# Patient Record
Sex: MALE | Race: OTHER | HISPANIC OR LATINO | Employment: UNEMPLOYED | URBAN - METROPOLITAN AREA
[De-identification: names, ages, dates, MRNs, and addresses within clinical notes are randomized per-mention and may not be internally consistent; named-entity substitution may affect disease eponyms.]

---

## 2023-04-28 ENCOUNTER — APPOINTMENT (EMERGENCY)
Dept: RADIOLOGY | Facility: HOSPITAL | Age: 38
End: 2023-04-28

## 2023-04-28 ENCOUNTER — HOSPITAL ENCOUNTER (EMERGENCY)
Facility: HOSPITAL | Age: 38
End: 2023-04-28
Attending: EMERGENCY MEDICINE

## 2023-04-28 ENCOUNTER — APPOINTMENT (EMERGENCY)
Dept: CT IMAGING | Facility: HOSPITAL | Age: 38
End: 2023-04-28

## 2023-04-28 ENCOUNTER — HOSPITAL ENCOUNTER (INPATIENT)
Facility: HOSPITAL | Age: 38
LOS: 2 days | Discharge: OTHER INSTITUTION | End: 2023-04-30
Attending: INTERNAL MEDICINE | Admitting: INTERNAL MEDICINE

## 2023-04-28 VITALS
RESPIRATION RATE: 16 BRPM | SYSTOLIC BLOOD PRESSURE: 131 MMHG | DIASTOLIC BLOOD PRESSURE: 66 MMHG | HEART RATE: 2 BPM | OXYGEN SATURATION: 92 % | TEMPERATURE: 97.3 F | WEIGHT: 190.3 LBS

## 2023-04-28 DIAGNOSIS — F19.10 SUBSTANCE ABUSE (HCC): Primary | ICD-10-CM

## 2023-04-28 DIAGNOSIS — J96.01 ACUTE RESPIRATORY FAILURE WITH HYPOXIA (HCC): ICD-10-CM

## 2023-04-28 DIAGNOSIS — S42.001A RIGHT CLAVICLE FRACTURE: ICD-10-CM

## 2023-04-28 DIAGNOSIS — R50.9 FEVER OF UNKNOWN ORIGIN: Primary | ICD-10-CM

## 2023-04-28 DIAGNOSIS — T40.2X1A OPIOID OVERDOSE (HCC): ICD-10-CM

## 2023-04-28 DIAGNOSIS — R41.82 ALTERED MENTAL STATUS: ICD-10-CM

## 2023-04-28 DIAGNOSIS — G47.00 INSOMNIA: ICD-10-CM

## 2023-04-28 DIAGNOSIS — F41.9 ANXIETY: ICD-10-CM

## 2023-04-28 DIAGNOSIS — J81.0 ACUTE PULMONARY EDEMA (HCC): ICD-10-CM

## 2023-04-28 PROBLEM — R00.0 SINUS TACHYCARDIA: Status: ACTIVE | Noted: 2023-04-28

## 2023-04-28 PROBLEM — J96.91 RESPIRATORY FAILURE WITH HYPOXIA (HCC): Status: ACTIVE | Noted: 2023-04-28

## 2023-04-28 PROBLEM — I48.91 ATRIAL FIBRILLATION (HCC): Status: ACTIVE | Noted: 2023-04-28

## 2023-04-28 LAB
ALBUMIN SERPL BCP-MCNC: 4 G/DL (ref 3.5–5)
ALP SERPL-CCNC: 84 U/L (ref 34–104)
ALT SERPL W P-5'-P-CCNC: 19 U/L (ref 7–52)
AMPHETAMINES SERPL QL SCN: NEGATIVE
ANION GAP SERPL CALCULATED.3IONS-SCNC: 16 MMOL/L (ref 4–13)
AST SERPL W P-5'-P-CCNC: 25 U/L (ref 13–39)
ATRIAL RATE: 102 BPM
BACTERIA UR QL AUTO: ABNORMAL /HPF
BARBITURATES UR QL: NEGATIVE
BASOPHILS # BLD AUTO: 0.04 THOUSANDS/ΜL (ref 0–0.1)
BASOPHILS NFR BLD AUTO: 1 % (ref 0–1)
BENZODIAZ UR QL: NEGATIVE
BILIRUB SERPL-MCNC: 0.36 MG/DL (ref 0.2–1)
BILIRUB UR QL STRIP: NEGATIVE
BUN SERPL-MCNC: 18 MG/DL (ref 5–25)
CALCIUM SERPL-MCNC: 8.9 MG/DL (ref 8.4–10.2)
CHLORIDE SERPL-SCNC: 99 MMOL/L (ref 96–108)
CK SERPL-CCNC: 235 U/L (ref 39–308)
CLARITY UR: ABNORMAL
CO2 SERPL-SCNC: 20 MMOL/L (ref 21–32)
COCAINE UR QL: POSITIVE
COLOR UR: YELLOW
CREAT SERPL-MCNC: 1.33 MG/DL (ref 0.6–1.3)
EOSINOPHIL # BLD AUTO: 0.2 THOUSAND/ΜL (ref 0–0.61)
EOSINOPHIL NFR BLD AUTO: 2 % (ref 0–6)
ERYTHROCYTE [DISTWIDTH] IN BLOOD BY AUTOMATED COUNT: 12.8 % (ref 11.6–15.1)
ETHANOL SERPL-MCNC: <10 MG/DL
FINE GRAN CASTS URNS QL MICRO: ABNORMAL /LPF
GFR SERPL CREATININE-BSD FRML MDRD: 66 ML/MIN/1.73SQ M
GLUCOSE SERPL-MCNC: 207 MG/DL (ref 65–140)
GLUCOSE SERPL-MCNC: 226 MG/DL (ref 65–140)
GLUCOSE UR STRIP-MCNC: NEGATIVE MG/DL
HCT VFR BLD AUTO: 40.6 % (ref 36.5–49.3)
HGB BLD-MCNC: 12.4 G/DL (ref 12–17)
HGB UR QL STRIP.AUTO: 25
HYALINE CASTS #/AREA URNS LPF: ABNORMAL /LPF
IMM GRANULOCYTES # BLD AUTO: 0.04 THOUSAND/UL (ref 0–0.2)
IMM GRANULOCYTES NFR BLD AUTO: 1 % (ref 0–2)
KETONES UR STRIP-MCNC: NEGATIVE MG/DL
LEUKOCYTE ESTERASE UR QL STRIP: NEGATIVE
LYMPHOCYTES # BLD AUTO: 2.83 THOUSANDS/ΜL (ref 0.6–4.47)
LYMPHOCYTES NFR BLD AUTO: 33 % (ref 14–44)
MCH RBC QN AUTO: 28.8 PG (ref 26.8–34.3)
MCHC RBC AUTO-ENTMCNC: 30.5 G/DL (ref 31.4–37.4)
MCV RBC AUTO: 94 FL (ref 82–98)
METHADONE UR QL: NEGATIVE
MONOCYTES # BLD AUTO: 0.72 THOUSAND/ΜL (ref 0.17–1.22)
MONOCYTES NFR BLD AUTO: 8 % (ref 4–12)
MUCOUS THREADS UR QL AUTO: ABNORMAL
NEUTROPHILS # BLD AUTO: 4.71 THOUSANDS/ΜL (ref 1.85–7.62)
NEUTS SEG NFR BLD AUTO: 55 % (ref 43–75)
NITRITE UR QL STRIP: NEGATIVE
NON-SQ EPI CELLS URNS QL MICRO: ABNORMAL /HPF
NRBC BLD AUTO-RTO: 0 /100 WBCS
OPIATES UR QL SCN: POSITIVE
OXYCODONE+OXYMORPHONE UR QL SCN: NEGATIVE
P AXIS: 78 DEGREES
PCP UR QL: NEGATIVE
PH UR STRIP.AUTO: 5 [PH]
PLATELET # BLD AUTO: 359 THOUSANDS/UL (ref 149–390)
PMV BLD AUTO: 9.2 FL (ref 8.9–12.7)
POTASSIUM SERPL-SCNC: 3.8 MMOL/L (ref 3.5–5.3)
PR INTERVAL: 142 MS
PROT SERPL-MCNC: 7.3 G/DL (ref 6.4–8.4)
PROT UR STRIP-MCNC: ABNORMAL MG/DL
QRS AXIS: 67 DEGREES
QRSD INTERVAL: 80 MS
QT INTERVAL: 350 MS
QTC INTERVAL: 456 MS
RBC # BLD AUTO: 4.31 MILLION/UL (ref 3.88–5.62)
RBC #/AREA URNS AUTO: ABNORMAL /HPF
SODIUM SERPL-SCNC: 135 MMOL/L (ref 135–147)
SP GR UR STRIP.AUTO: 1.02 (ref 1–1.04)
T WAVE AXIS: 63 DEGREES
THC UR QL: NEGATIVE
UROBILINOGEN UA: NEGATIVE MG/DL
VENTRICULAR RATE: 102 BPM
WBC # BLD AUTO: 8.54 THOUSAND/UL (ref 4.31–10.16)
WBC #/AREA URNS AUTO: ABNORMAL /HPF

## 2023-04-28 RX ORDER — CHLORHEXIDINE GLUCONATE 0.12 MG/ML
15 RINSE ORAL EVERY 12 HOURS SCHEDULED
Status: DISCONTINUED | OUTPATIENT
Start: 2023-04-28 | End: 2023-04-29

## 2023-04-28 RX ORDER — NALOXONE HYDROCHLORIDE 1 MG/ML
INJECTION INTRAMUSCULAR; INTRAVENOUS; SUBCUTANEOUS
Status: COMPLETED
Start: 2023-04-28 | End: 2023-04-28

## 2023-04-28 RX ORDER — FAMOTIDINE 10 MG/ML
20 INJECTION, SOLUTION INTRAVENOUS 2 TIMES DAILY
Status: DISCONTINUED | OUTPATIENT
Start: 2023-04-28 | End: 2023-04-28

## 2023-04-28 RX ORDER — HALOPERIDOL 5 MG/ML
5 INJECTION INTRAMUSCULAR ONCE
Status: COMPLETED | OUTPATIENT
Start: 2023-04-28 | End: 2023-04-28

## 2023-04-28 RX ORDER — NALOXONE HYDROCHLORIDE 1 MG/ML
2 INJECTION INTRAMUSCULAR; INTRAVENOUS; SUBCUTANEOUS ONCE
Status: DISCONTINUED | OUTPATIENT
Start: 2023-04-28 | End: 2023-04-28

## 2023-04-28 RX ORDER — MIDAZOLAM HYDROCHLORIDE 1 MG/ML
1 INJECTION INTRAMUSCULAR; INTRAVENOUS ONCE
Status: COMPLETED | OUTPATIENT
Start: 2023-04-28 | End: 2023-04-28

## 2023-04-28 RX ORDER — NALOXONE HYDROCHLORIDE 1 MG/ML
2 INJECTION INTRAMUSCULAR; INTRAVENOUS; SUBCUTANEOUS ONCE
Status: COMPLETED | OUTPATIENT
Start: 2023-04-28 | End: 2023-04-28

## 2023-04-28 RX ORDER — ACETAMINOPHEN 650 MG/1
650 SUPPOSITORY RECTAL EVERY 4 HOURS PRN
Status: DISCONTINUED | OUTPATIENT
Start: 2023-04-28 | End: 2023-04-29

## 2023-04-28 RX ORDER — NALOXONE HYDROCHLORIDE 1 MG/ML
INJECTION INTRAMUSCULAR; INTRAVENOUS; SUBCUTANEOUS
Status: DISCONTINUED
Start: 2023-04-28 | End: 2023-04-28 | Stop reason: HOSPADM

## 2023-04-28 RX ORDER — NALOXONE HYDROCHLORIDE 1 MG/ML
1 INJECTION PARENTERAL ONCE
Status: COMPLETED | OUTPATIENT
Start: 2023-04-28 | End: 2023-04-28

## 2023-04-28 RX ORDER — LORAZEPAM 2 MG/ML
2 INJECTION INTRAMUSCULAR ONCE
Status: COMPLETED | OUTPATIENT
Start: 2023-04-28 | End: 2023-04-28

## 2023-04-28 RX ORDER — FUROSEMIDE 10 MG/ML
40 INJECTION INTRAMUSCULAR; INTRAVENOUS ONCE
Status: COMPLETED | OUTPATIENT
Start: 2023-04-28 | End: 2023-04-28

## 2023-04-28 RX ORDER — BENZTROPINE MESYLATE 1 MG/ML
2 INJECTION INTRAMUSCULAR; INTRAVENOUS ONCE
Status: COMPLETED | OUTPATIENT
Start: 2023-04-28 | End: 2023-04-28

## 2023-04-28 RX ADMIN — NALOXONE HYDROCHLORIDE 0.8 MG/HR: 1 INJECTION INTRAMUSCULAR; INTRAVENOUS; SUBCUTANEOUS at 10:16

## 2023-04-28 RX ADMIN — NALOXONE HYDROCHLORIDE 2 MG: 1 INJECTION INTRAMUSCULAR; INTRAVENOUS; SUBCUTANEOUS at 09:02

## 2023-04-28 RX ADMIN — SODIUM CHLORIDE 1000 ML: 0.9 INJECTION, SOLUTION INTRAVENOUS at 09:13

## 2023-04-28 RX ADMIN — NALOXONE HYDROCHLORIDE 0.4 MG/HR: 1 INJECTION INTRAMUSCULAR; INTRAVENOUS; SUBCUTANEOUS at 07:51

## 2023-04-28 RX ADMIN — LORAZEPAM 2 MG: 2 INJECTION INTRAMUSCULAR; INTRAVENOUS at 05:49

## 2023-04-28 RX ADMIN — NALOXONE HYDROCHLORIDE 0.8 MG/HR: 1 INJECTION PARENTERAL at 13:53

## 2023-04-28 RX ADMIN — SODIUM CHLORIDE 1000 ML: 0.9 INJECTION, SOLUTION INTRAVENOUS at 06:10

## 2023-04-28 RX ADMIN — ACETAMINOPHEN 650 MG: 650 SUPPOSITORY RECTAL at 14:31

## 2023-04-28 RX ADMIN — BENZTROPINE MESYLATE 2 MG: 1 INJECTION INTRAMUSCULAR; INTRAVENOUS at 05:49

## 2023-04-28 RX ADMIN — NALOXONE HYDROCHLORIDE 0.8 MG/HR: 1 INJECTION INTRAMUSCULAR; INTRAVENOUS; SUBCUTANEOUS at 11:51

## 2023-04-28 RX ADMIN — NALOXONE HYDROCHLORIDE 2 MG: 1 INJECTION INTRAMUSCULAR; INTRAVENOUS; SUBCUTANEOUS at 07:05

## 2023-04-28 RX ADMIN — NALOXONE HYDROCHLORIDE 2 MG: 1 INJECTION INTRAMUSCULAR; INTRAVENOUS; SUBCUTANEOUS at 06:10

## 2023-04-28 RX ADMIN — CHLORHEXIDINE GLUCONATE 0.12% ORAL RINSE 15 ML: 1.2 LIQUID ORAL at 20:17

## 2023-04-28 RX ADMIN — FAMOTIDINE 20 MG: 10 INJECTION INTRAVENOUS at 18:39

## 2023-04-28 RX ADMIN — FUROSEMIDE 40 MG: 10 INJECTION, SOLUTION INTRAVENOUS at 06:41

## 2023-04-28 RX ADMIN — HALOPERIDOL LACTATE 5 MG: 5 INJECTION, SOLUTION INTRAMUSCULAR at 05:49

## 2023-04-28 NOTE — ASSESSMENT & PLAN NOTE
· History of atrial fibrillation   · Sinus tachycardia at this time  · Continuous cardiopulmonary monitoring  · Unknown home medications  · Anticoagulate if needed

## 2023-04-28 NOTE — ASSESSMENT & PLAN NOTE
· Secondary to opioid overdose   · Resolved with Narcan drip  · Admitted to the ICU  · Continuous cardiopulmonary monitoring

## 2023-04-28 NOTE — RESTRAINT FACE TO FACE
Restraint Face to Face   Faith Quintero 42 y o  male MRN: [id number]  Unit/Bed#: ED 11 Encounter: [id number]      Physical Evaluation agitated, aggressive  Purpose for Restraints/ Seclusion High risk for harm to others  Patient's reaction to the intervention agitated  Patient's medical condition stable, guarded  Patient's Behavioral condition agitated  Restraints to be Continued

## 2023-04-28 NOTE — PLAN OF CARE
Problem: RESPIRATORY - ADULT  Goal: Achieves optimal ventilation and oxygenation  Description: INTERVENTIONS:  - Assess for changes in respiratory status  - Assess for changes in mentation and behavior  - Position to facilitate oxygenation and minimize respiratory effort  - Oxygen administered by appropriate delivery if ordered  - Initiate smoking cessation education as indicated  - Encourage broncho-pulmonary hygiene including cough, deep breathe, Incentive Spirometry  - Assess the need for suctioning and aspirate as needed  - Assess and instruct to report SOB or any respiratory difficulty  - Respiratory Therapy support as indicated  Outcome: Progressing     Problem: METABOLIC, FLUID AND ELECTROLYTES - ADULT  Goal: Electrolytes maintained within normal limits  Description: INTERVENTIONS:  - Monitor labs and assess patient for signs and symptoms of electrolyte imbalances  - Administer electrolyte replacement as ordered  - Monitor response to electrolyte replacements, including repeat lab results as appropriate  - Instruct patient on fluid and nutrition as appropriate  Outcome: Progressing  Goal: Fluid balance maintained  Description: INTERVENTIONS:  - Monitor labs   - Monitor I/O and WT  - Instruct patient on fluid and nutrition as appropriate  - Assess for signs & symptoms of volume excess or deficit  Outcome: Progressing  Goal: Glucose maintained within target range  Description: INTERVENTIONS:  - Monitor Blood Glucose as ordered  - Assess for signs and symptoms of hyperglycemia and hypoglycemia  - Administer ordered medications to maintain glucose within target range  - Assess nutritional intake and initiate nutrition service referral as needed  Outcome: Progressing

## 2023-04-28 NOTE — EMTALA/ACUTE CARE TRANSFER
[facility] EMERGENCY DEPARTMENT  [address] [facility] [address]  [phone]  Dept: [phone]      EMTALA TRANSFER CONSENT    NAME Margaret ZHOU 1985                              MRN [id number]    I have been informed of my rights regarding examination, treatment, and transfer   by Dr Rosalva Feng, *    Benefits:  ICU care    Risks:  delay in care      Transfer Request   I acknowledge that my medical condition has been evaluated and explained to me by the emergency department physician or other qualified medical person and/or my attending physician who has recommended and offered to me further medical examination and treatment  I understand the Hospital's obligation with respect to the treatment and stabilization of my emergency medical condition  I nevertheless request to be transferred  I release the Hospital, the doctor, and any other persons caring for me from all responsibility or liability for any injury or ill effects that may result from my transfer and agree to accept all responsibility for the consequences of my choice to transfer, rather than receive stabilizing treatment at the Hospital  I understand that because the transfer is my request, my insurance may not provide reimbursement for the services  The Hospital will assist and direct me and my family in how to make arrangements for transfer, but the hospital is not liable for any fees charged by the transport service  In spite of this understanding, I refuse to consent to further medical examination and treatment which has been offered to me, and request transfer to    I authorize the performance of emergency medical procedures and treatments upon me in both transit and upon arrival at the receiving facility    Additionally, I authorize the release of any and all medical records to the receiving facility and request they be transported with me, if possible  I authorize the performance of emergency medical procedures and treatments upon me in both transit and upon arrival at the receiving facility  Additionally, I authorize the release of any and all medical records to the receiving facility and request they be transported with me, if possible  I understand that the safest mode of transportation during a medical emergency is an ambulance and that the Hospital advocates the use of this mode of transport  Risks of traveling to the receiving facility by car, including absence of medical control, life sustaining equipment, such as oxygen, and medical personnel has been explained to me and I fully understand them  (VIGNESH CORRECT BOX BELOW)  [  ]  I consent to the stated transfer and to be transported by ambulance/helicopter  [  ]  I consent to the stated transfer, but refuse transportation by ambulance and accept full responsibility for my transportation by car  I understand the risks of non-ambulance transfers and I exonerate the Hospital and its staff from any deterioration in my condition that results from this refusal     X___________________________________________    DATE  23  TIME________  Signature of patient or legally responsible individual signing on patient behalf           RELATIONSHIP TO PATIENT_________________________          Provider Certification    NAME Lillie Larkin                                        [facility] 1985                              MRN [id number]    A medical screening exam was performed on the above named patient  Based on the examination:    Condition Necessitating Transfer The primary encounter diagnosis was Substance abuse ([address] )  Diagnoses of Altered mental status, Acute respiratory failure with hypoxia ([address] ), and Opioid overdose ([facility] [address] ) were also pertinent to this visit      Patient Condition:      Reason for Transfer:      Transfer Requirements: Facility     · Space available and qualified personnel available for treatment as acknowledged by    · Agreed to accept transfer and to provide appropriate medical treatment as acknowledged by          · Appropriate medical records of the examination and treatment of the patient are provided at the time of transfer   [address] [facility], [address] _______  · Transfer will be performed by qualified personnel from    and appropriate transfer equipment as required, including the use of necessary and appropriate life support measures  Provider Certification: I have examined the patient and explained the following risks and benefits of being transferred/refusing transfer to the patient/family:         Based on these reasonable risks and benefits to the patient and/or the unborn child(javon), and based upon the information available at the time of the patient’s examination, I certify that the medical benefits reasonably to be expected from the provision of appropriate medical treatments at another medical facility outweigh the increasing risks, if any, to the individual’s medical condition, and in the case of labor to the unborn child, from effecting the transfer      X____________________________________________ DATE 04/28/23        TIME_______      ORIGINAL - SEND TO MEDICAL RECORDS   COPY - SEND WITH PATIENT DURING TRANSFER

## 2023-04-28 NOTE — ASSESSMENT & PLAN NOTE
· Present on admission to the emergency department at [facility]  · Resolved with oxygen administration  · Repeat chest x-ray in the morning

## 2023-04-28 NOTE — H&P
[address] [place] [address]  H&P  Name: Rebecca Martinez 42 y o  male I MRN: [id number]  Unit/Bed#: ICU 04 I Date of Admission: 4/28/2023   Date of Service: 4/28/2023 I Hospital Day: 0      Assessment/Plan   * Opioid overdose ([address] )  Assessment & Plan  · Patient admits to using heroin and cocaine  · Requiring a Narcan drip  · Transfer from [facility]  · Admit to the ICU    Sinus tachycardia  Assessment & Plan  · Likely secondary to febrile state  · Tylenol to treat fever  · Monitor heart rate    Acute pulmonary edema (HCC)  Assessment & Plan  · Present on admission to the emergency department at [facility]  · Resolved with oxygen administration  · Repeat chest x-ray in the morning    Fever of unknown origin  Assessment & Plan  · Temperature 101  · Rectal Tylenol ordered  · Every 4 hours vital signs  · Check blood cultures    Atrial fibrillation (HCC)  Assessment & Plan  · History of atrial fibrillation  · Sinus tachycardia at this time  · Continuous cardiopulmonary monitoring  · Unknown home medications  · Anticoagulate if needed      Respiratory failure with hypoxia (HCC)  Assessment & Plan  · Secondary to opioid overdose  · Resolved with Narcan drip  · Admitted to the ICU  · Continuous cardiopulmonary monitoring           HPI:    Rebecca Martinez is a 42 y o  male who presents as a transfer from [address] [place] [address]  Not much is known about the patient  He was found facedown on a stairway and unresponsive  EMS was activated and the patient was transported to [address] [facility] emergency department  He was given 10 mg of Narcan in the emergency department which caused him to begin to wake up  At that time he admitted to using heroin and crack cocaine  He shortly became unresponsive again and was started on a Narcan drip  Upon arrival to [facility] [place] [place] patient does not respond to loud voice or noxious stimuli just grunting and moving away    He is protecting his airway and respirating without difficulty  He is not responding to questions at all  Review of Systems:  Unable to obtain as the patient is unresponsive  Historical Information   Past Medical History:   Diagnosis Date    Atrial fibrillation ([address] )      No past surgical history on file  Social History   Social History     Substance and Sexual Activity   Alcohol Use Not Currently     Social History     Substance and Sexual Activity   Drug Use Yes    Comment: K2     Social History     Tobacco Use   Smoking Status Every Day    Types: Cigarettes   Smokeless Tobacco Never       Family History:   No family history on file  Medications:  Pertinent medications were reviewed  Current Facility-Administered Medications   Medication Dose Route Frequency Provider Last Rate    acetaminophen  650 mg Rectal Q4H PRN Lucilla Sonia, CRNP      chlorhexidine  15 mL Mouth/Throat Q12H [address] Lucilla Sonia, CRNP      Famotidine (PF)  20 mg Intravenous BID Lucilla Sonia, CRNP      naloxone [facility]) 500 mL infusion  0 8 mg/hr Intravenous Continuous Lucilla Sonia, CRNP 0 8 mg/hr (04/28/23 1353)         Allergies   Allergen Reactions    Robaxin [Methocarbamol] Other (See Comments)     Not specified         Vitals:   /70 (BP Location: Right arm)   Pulse 105   Temp (!) 101 8 °F (38 8 °C) (Axillary)   Resp 20   SpO2 91%   There is no height or weight on file to calculate BMI    SpO2: 91 %,   SpO2 Activity: At Rest,   O2 Device: None (Room air)      Intake/Output Summary (Last 24 hours) at 4/28/2023 1353  Last data filed at 4/28/2023 1231  Gross per 24 hour   Intake --   Output 50 ml   Net -50 ml     Invasive Devices     Peripheral Intravenous Line  Duration           Peripheral IV 04/28/23 Left;Proximal;Ventral (anterior) Forearm <1 day          Drain  Duration           Urethral Catheter Latex 16 Fr  <1 day                Physical Exam:  Gen: Snoring and unresponsive  HEENT: Atraumatic normocephalic pupils equal round reactive to light extraocular movements intact sclera anicteric oral mucosa is pink and moist  Neck: Supple no JVD no lymphadenopathy trachea midline  Chest: Rhonchorous bilaterally respirations are tachypneic and regular  Cor: Heart tones are difficult to auscultate secondary to lung sounds  Monitor demonstrates sinus tachycardia  Abd: Soft nontender with positive bowel sounds  Ext: No clubbing cyanosis or edema  Neuro: Somnolent, unresponsive  Skin: Warm dry and intact no rash multiple tattoos      Diagnostic Data:  Lab: I have personally reviewed pertinent lab results  ,   CBC:  Results from last 7 days   Lab Units 04/28/23  0550   WBC Thousand/uL 8 54   HEMOGLOBIN g/dL 12 4   HEMATOCRIT % 40 6   PLATELETS Thousands/uL 359      CMP:   Lab Results   Component Value Date    SODIUM 135 04/28/2023    K 3 8 04/28/2023    CL 99 04/28/2023    CO2 20 (L) 04/28/2023    BUN 18 04/28/2023    CREATININE 1 33 (H) 04/28/2023    CALCIUM 8 9 04/28/2023    AST 25 04/28/2023    ALT 19 04/28/2023    ALKPHOS 84 04/28/2023    EGFR 66 04/28/2023   ,   PT/INR: No results found for: PT, INR,   Magnesium: No components found for: MAG,  Phosphorous: No results found for: PHOS    ABG: No results found for: PHART, KHJ7LFW, PO2ART, BTW7STY, Z4REIZOK, BEART, SOURCE,     Microbiology:  Blood cultures ordered    Imaging: I have personally reviewed the pertinent imaging studies on the PACS system  Persistent bilateral upper lung opacity, aspiration versus reexpansion pulmonary edema  Right wrist x-ray demonstrates no fracture or deformity  Left wrist x-ray demonstrates no fracture or deformity  C-spine x-ray demonstrates: No acute compression collapse vertebra  Deformity of the right clavicle with a nonunited fracture  Correlate clinically to exclude any acute injury    Correlation with the radiograph of the clavicle suggest  CT of the head mistreats no mass effect, midline shift or hemorrhage      Cardiac/EKG/telemetry/Echo:   Results from "last 7 days   Lab Units 04/28/23  0550   CK TOTAL U/L 235     Sinus tachycardia      VTE Prophylaxis: Sequential compression device Deandradejuan Blue)     Code Status: Level 1 - Full Code    VERONICA Kirkland    Portions of the record may have been created with voice recognition software  Occasional wrong word or \"sound a like\" substitutions may have occurred due to the inherent limitations of voice recognition software  Read the chart carefully and recognize, using context, where substitutions have occurred    "

## 2023-04-28 NOTE — ASSESSMENT & PLAN NOTE
· Patient admits to using heroin and cocaine  · Requiring a Narcan drip  · Transfer from [address] [place] [address]  · Admit to the ICU

## 2023-04-28 NOTE — CERTIFIED RECOVERY SPECIALIST
Certified  Note    Patient name: Nani Shannon  Location: [facility] Pool [facility]  [place]: [address] [place] [address]  Attending:  Paul Mcwilliams, * MRN [id number]  : 1985  Age: 42 y o  Sex: male Date 2023         Substance Use History:     Social History     Substance and Sexual Activity   Alcohol Use Not Currently        Social History     Substance and Sexual Activity   Drug Use Yes    Comment: K2     CRS received consult to meet with patient  Patient will be transferred to [address]    CRS will follow up at the [facility] when he arrives and if he is still hospitalized     Clifton Angeles

## 2023-04-28 NOTE — ASSESSMENT & PLAN NOTE
· Patient admits to using heroin and cocaine  · Requiring a Narcan drip which has been off since around 1400 on 4/28  · Transfer from [facility]  · Admit to the ICU

## 2023-04-28 NOTE — ED NOTES
Momin catheter placed  Only a few mls of urine return; patient's pants are saturated with urine from prior to arrival  Provider katiana Coleman RN  04/28/23 7959

## 2023-04-28 NOTE — ED PROVIDER NOTES
History  Chief Complaint   Patient presents with   • Recreational Drug Use     Arrives via EMS and [employer] after being found yelling and screaming; suspect K2 use  Patient arrives to ED yelling and screaming non stop  77-year-old gentleman was found unresponsive in an alley  He was lying on his side with some vomitus near his person  He was given 2 mg of Narcan IV at which time he awoke and was acutely agitated and violent  He calmed down momentarily and then continued to fight and screaming yelling and cursing in [address]  Police were contacted and assisted in securing the patient for transport  Patient arrived continuing to fight the medics as he even after being handcuffed to the letter  Patient was not redirectable  Prior to moving him from the transport letter to our letter, he was given Haldol, Ativan, and Cogentin  Soon after the patient began speaking in [place] asking what happened  He admitted to doing drugs but stated he did not know which kind  Altered Mental Status  Presenting symptoms: behavior changes, combativeness and unresponsiveness    Severity:  Severe  Most recent episode: Today  Episode history:  Single  Duration: unknown  Timing:  Constant  Progression:  Waxing and waning  Chronicity: unknown  Context: drug use    Associated symptoms: agitation        None       Past Medical History:   Diagnosis Date   • Atrial fibrillation ([facility] [address] )        History reviewed  No pertinent surgical history  History reviewed  No pertinent family history  I have reviewed and agree with the history as documented      E-Cigarette/Vaping   • E-Cigarette Use Never User      E-Cigarette/Vaping Substances   • Nicotine No    • THC No    • CBD No    • Flavoring No      Social History     Tobacco Use   • Smoking status: Every Day     Types: Cigarettes   • Smokeless tobacco: Never   Vaping Use   • Vaping Use: Never used   Substance Use Topics   • Alcohol use: Not Currently   • Drug use: Yes     Comment: K2 Review of Systems   Unable to perform ROS: Mental status change   Skin: Positive for wound (abrasion to right wrist)  Psychiatric/Behavioral: Positive for agitation  Physical Exam  Physical Exam  Vitals and nursing note reviewed  Constitutional:       Appearance: He is diaphoretic  HENT:      Head: Normocephalic and atraumatic  Right Ear: External ear normal       Left Ear: External ear normal       Nose: Nose normal       Mouth/Throat:      Mouth: Mucous membranes are moist    Eyes:      Conjunctiva/sclera: Conjunctivae normal    Cardiovascular:      Rate and Rhythm: Tachycardia present  Pulmonary:      Effort: Pulmonary effort is normal  No respiratory distress  Abdominal:      Tenderness: There is no abdominal tenderness  Comments: Large surgical scar noted     Musculoskeletal:      Cervical back: Normal range of motion  Skin:     General: Skin is warm  Neurological:      Mental Status: He is alert  He is confused  GCS: GCS eye subscore is 4  GCS verbal subscore is 4  GCS motor subscore is 5  Cranial Nerves: No cranial nerve deficit  Motor: No weakness  Psychiatric:         Mood and Affect: Affect is angry and inappropriate  Speech: Speech is rapid and pressured  Behavior: Behavior is agitated, aggressive and combative  Judgment: Judgment is impulsive and inappropriate           Vital Signs  ED Triage Vitals   Temperature Pulse Respirations Blood Pressure SpO2   04/28/23 0610 04/28/23 0555 04/28/23 0555 04/28/23 0555 04/28/23 0555   98 °F (36 7 °C) 98 18 (!) 126/105 90 %      Temp Source Heart Rate Source Patient Position - Orthostatic VS BP Location FiO2 (%)   04/28/23 0610 04/28/23 0555 04/28/23 0555 04/28/23 0555 --   Tympanic Monitor Lying Right arm       Pain Score       --                  Vitals:    04/28/23 0555 04/28/23 0610   BP: (!) 126/105 131/77   Pulse: 98 (!) 112   Patient Position - Orthostatic VS: Lying          Visual Acuity  Visual Acuity    Flowsheet Row Most Recent Value   L Pupil Size (mm) 1   R Pupil Size (mm) 1          ED Medications  Medications   naloxone (FOR EMS ONLY) (NARCAN) 2 MG/2ML injection 2 mg (0 mg Does not apply Given to EMS 4/28/23 0548)   sodium chloride 0 9 % bolus 1,000 mL (0 mL Intravenous Stopped 4/28/23 0620)   haloperidol lactate (HALDOL) injection 5 mg (5 mg Intramuscular Given 4/28/23 0549)   LORazepam (ATIVAN) injection 2 mg (2 mg Intramuscular Given 4/28/23 0549)   benztropine (COGENTIN) injection 2 mg (2 mg Intramuscular Given 4/28/23 0549)   midazolam (FOR EMS ONLY) (VERSED) 2 mg/2 mL injection 2 mg (0 mg Does not apply Given to EMS 4/28/23 0548)   naloxone [facility]) injection 2 mg (2 mg Intravenous Given 4/28/23 0610)   furosemide (LASIX) injection 40 mg (40 mg Intravenous Given 4/28/23 0641)       Diagnostic Studies  Results Reviewed     Procedure Component Value Units Date/Time    UA (URINE) with reflex to Scope [[id number]] Updated: 04/28/23 0651    Lab Status: No result Specimen: Urine, Indwelling Momin Catheter     Rapid drug screen, urine [[id number]] Collected: 04/28/23 0647    Lab Status: No result Specimen: Urine, Catheter     Comprehensive metabolic panel [[id number]]  (Abnormal) Collected: 04/28/23 0550    Lab Status: Final result Specimen: Blood from Line, Venous Updated: 04/28/23 0618     Sodium 135 mmol/L      Potassium 3 8 mmol/L      Chloride 99 mmol/L      CO2 20 mmol/L      ANION GAP 16 mmol/L      BUN 18 mg/dL      Creatinine 1 33 mg/dL      Glucose 207 mg/dL      Calcium 8 9 mg/dL      AST 25 U/L      ALT 19 U/L      Alkaline Phosphatase 84 U/L      Total Protein 7 3 g/dL      Albumin 4 0 g/dL      Total Bilirubin 0 36 mg/dL      eGFR 66 ml/min/1 73sq m     Narrative:      Jonathon guidelines for Chronic Kidney Disease (CKD):   •  Stage 1 with normal or high GFR (GFR > 90 mL/min/1 73 square meters)  •  Stage 2 Mild CKD (GFR = 60-89 mL/min/1 73 square meters)  •  Stage 3A Moderate CKD (GFR = 45-59 mL/min/1 73 square meters)  •  Stage 3B Moderate CKD (GFR = 30-44 mL/min/1 73 square meters)  •  Stage 4 Severe CKD (GFR = 15-29 mL/min/1 73 square meters)  •  Stage 5 End Stage CKD (GFR <15 mL/min/1 73 square meters)  Note: GFR calculation is accurate only with a steady state creatinine    CK [[id number]]  (Normal) Collected: 04/28/23 0550    Lab Status: Final result Specimen: Blood from Line, Venous Updated: 04/28/23 0618     Total  U/L     Ethanol [[id number]]  (Normal) Collected: 04/28/23 0550    Lab Status: Final result Specimen: Blood from Arm, Left Updated: 04/28/23 0616     Ethanol Lvl <10 mg/dL     CBC and differential [[id number]]  (Abnormal) Collected: 04/28/23 0550    Lab Status: Final result Specimen: Blood from Line, Venous Updated: 04/28/23 0556     WBC 8 54 Thousand/uL      RBC 4 31 Million/uL      Hemoglobin 12 4 g/dL      Hematocrit 40 6 %      MCV 94 fL      MCH 28 8 pg      MCHC 30 5 g/dL      RDW 12 8 %      MPV 9 2 fL      Platelets 659 Thousands/uL      nRBC 0 /100 WBCs      Neutrophils Relative 55 %      Immat GRANS % 1 %      Lymphocytes Relative 33 %      Monocytes Relative 8 %      Eosinophils Relative 2 %      Basophils Relative 1 %      Neutrophils Absolute 4 71 Thousands/µL      Immature Grans Absolute 0 04 Thousand/uL      Lymphocytes Absolute 2 83 Thousands/µL      Monocytes Absolute 0 72 Thousand/µL      Eosinophils Absolute 0 20 Thousand/µL      Basophils Absolute 0 04 Thousands/µL     Fingerstick Glucose (POCT) [[id number]]  (Abnormal) Collected: 04/28/23 0548    Lab Status: Final result Updated: 04/28/23 0552     POC Glucose 226 mg/dl                  CT head without contrast    (Results Pending)   XR chest 1 view portable    (Results Pending)              Procedures  ECG 12 Lead Documentation Only    Date/Time: 4/28/2023 5:54 AM  Performed by: Zay Huang DO  Authorized by:  Zay Huang DO     ECG reviewed by me, the ED Provider: yes    Patient location:  ED  Rate:     ECG rate:  102    ECG rate assessment: tachycardic    Rhythm:     Rhythm: sinus tachycardia    Ectopy:     Ectopy: none    QRS:     QRS axis:  Normal  Conduction:     Conduction: normal    ST segments:     ST segments:  Normal  T waves:     T waves: non-specific      CriticalCare Time    Date/Time: 4/28/2023 6:13 AM  Performed by: Carla Uriostegui DO  Authorized by: Carla Uriostegui DO     Critical care provider statement:     Critical care time (minutes):  60    Critical care time was exclusive of:  Separately billable procedures and treating other patients    Critical care was necessary to treat or prevent imminent or life-threatening deterioration of the following conditions:  CNS failure or compromise, toxidrome and respiratory failure    Critical care was time spent personally by me on the following activities:  Blood draw for specimens, obtaining history from patient or surrogate, development of treatment plan with patient or surrogate, evaluation of patient's response to treatment, examination of patient, review of old charts, re-evaluation of patient's condition, ordering and review of radiographic studies, ordering and review of laboratory studies and ordering and performing treatments and interventions    I assumed direction of critical care for this patient from another provider in my specialty: no               ED Course  ED Course as of 04/28/23 0714   Fri Apr 28, 2023   0610 Patient just had an episode where he became less responsive and had oxygen saturations in the upper 70s  Pupils were again pinpoint  He was placed on a nonrebreather  An additional 2 mg of Narcan were given  He became more awake and was able to answer additional questions and his oxygen saturation improved to the low 90s    0621 Portable chest x-ray shows some pulmonary edema  CK is unremarkable  IV fluids have been discontinued     3847 Momin catheter placed for acquisition of urine and monitoring of I's and O's    [id number] Additional packets of heroin and other drugs have been found on the patient  They were disposed of properly  2063 The patient remains sedate  Oxygen levels are low - mid 90s on NRB  Awaiting head CT    0702 The patient again has pinpoint pupils and is less responsive  An additional 2 mg of narcan being given  MDM    Disposition  Final diagnoses:   Substance abuse ([facility] [place] [address] )   Altered mental status     Time reflects when diagnosis was documented in both MDM as applicable and the Disposition within this note     Time User Action Codes Description Comment    4/28/2023  6:13 AM Kyle Apodaca Add [F19 10] Substance abuse ([facility] [place] [address] )     4/28/2023  6:13 AM Kyle Apodaca Add [R41 82] Altered mental status       ED Disposition     None      Follow-up Information    None         Patient's Medications    No medications on file       No discharge procedures on file      PDMP Review     None          ED Provider  Electronically Signed by           Claudetta Plume, DO  04/28/23 2584

## 2023-04-28 NOTE — ED NOTES
[facility] ICU 4  [facility]  [address]  Dr Zelaya Hazard  Report: LEILANI Rodriguez  04/28/23 5562

## 2023-04-28 NOTE — ASSESSMENT & PLAN NOTE
· Present on admission to the emergency department at [facility]  · Resolved with oxygen administration

## 2023-04-28 NOTE — ED CARE HANDOFF
Emergency Department Sign Out Note        Sign out and transfer of care from Dr Rahul Velasquez  See Separate Emergency Department note  The patient, Merlinda Combe, was evaluated by the previous provider for opioid overdose  Workup Completed:  Labs, cardiac monitor    ED Course / Workup Pending (followup):  68-year-old male with opiate overdose  After Narcan administration, patient awake and alert and notes that he used cocaine and heroin  He does note that his right shoulder injury was old and no new injury with no new shoulder pain  Patient requiring multiple boluses of Narcan despite being on a Narcan drip, case discussed with detox service who is unable to accept patient due to ongoing need for Narcan and possible need for intubation if he deteriorates  Unable to admit to [facility] service here as Narcan drip is only accepted at stepdown level 1 level which is not existant at this hospital   Patient accepted by Dr Elder Bejarano at [facility] critical care for transfer  Initially on my arrival patient was on nonrebreather, throughout the next 3 hours patient was slowly weaned off of nonrebreather to room air oxygen with no further hypoxia  Patient's pulmonary edema improved on repeat chest x-ray  Patient has good urine output per Momin catheter  CT head and C-spine unremarkable without new findings, new fractures or dislocation  ED Course as of 04/28/23 1042   Fri Apr 28, 2023   9873 Patient attest to using cocaine and heroin  8398 Patient out of restraints, CT head and C-spine unremarkable  Bilateral wrist x-rays without fracture or dislocation  6494 Patient now weaned down to 2 L nasal cannula, was maintaining 100% pulse ox on 4 L nasal cannula  Patient now alert and oriented to person place and time  8249 CT of the neck shows possible right clavicle fracture  Patient has no tenderness at the right clavicle likely old fracture     0663 Patient now altered again per  "Kwai  I went back and evaluated patient, pupils now small again, but did wake up and when I asked him \"De Indira Hang? \" He said Mary  Will give further Narcan, but seems that patient is not altered  Oxygen no longer required  0106 Completely awake now that repeat bolus of Narcan given  Patient confirms that right clavicle fracture is old and not new  Narcan drip increased to 0 8 mcg/hour Dr Benita Paula notified and requested to come re-evaluate patient  0845 Repeat chest x-ray shows improved pulmonary edema  Case discussed with Dr Ovidio Ortiz with [facility] who is unable to accept patient for admission as patient is on Narcan drip and Narcan drip at this hospital can only be administered in the detox unit   Transfer initated     CriticalCare Time    Date/Time: 4/28/2023 10:44 AM  Performed by: Javier Rojas MD  Authorized by: Javier Rojas MD     Critical care provider statement:     Critical care time (minutes):  60    Critical care start time:  4/28/2023 7:00 AM    Critical care end time:  4/28/2023 10:44 AM    Critical care time was exclusive of:  Separately billable procedures and treating other patients    Critical care was necessary to treat or prevent imminent or life-threatening deterioration of the following conditions:  Respiratory failure    Critical care was time spent personally by me on the following activities:  Blood draw for specimens, obtaining history from patient or surrogate, development of treatment plan with patient or surrogate, discussions with consultants, evaluation of patient's response to treatment, examination of patient, ordering and performing treatments and interventions, ordering and review of laboratory studies, ordering and review of radiographic studies and re-evaluation of patient's condition      MDM        Disposition  Final diagnoses:   Substance abuse ([facility] [address] )   Altered mental status   Acute respiratory failure with hypoxia ([facility] [address] )   Opioid overdose ([facility] [address] ) " Time reflects when diagnosis was documented in both MDM as applicable and the Disposition within this note     Time User Action Codes Description Comment    4/28/2023  6:13 AM Christiano Power Add [F19 10] Substance abuse ([facility] [address] )     4/28/2023  6:13 AM Christiano Power Add [R41 82] Altered mental status     4/28/2023  9:35 AM Margivan Longs Add [J96 01] Acute respiratory failure with hypoxia ([facility] [address] )     4/28/2023 10:11 AM Margert Longs Add [T40 2X1A] Opioid overdose [facility])       ED Disposition     ED Disposition   Transfer to Another Facility-In Network    Condition   --    Date/Time   Fri Apr 28, 2023 10:11 AM    Comment   Sania Meléndez should be transferred out to [address]  MD Documentation    Marquez Seat Most Recent Value   Patient Condition The patient has been stabilized such that within reasonable medical probability, no material deterioration of the patient condition or the condition of the unborn child(javon) is likely to result from the transfer   Reason for Transfer Level of Care needed not available at this facility   Benefits of Transfer Specialized equipment and/or services available at the receiving facility (Include comment)________________________  Faina lundberg]   Risks of Transfer Potential for delay in receiving treatment, Loss of IV   Accepting Physician Dr Temo Spencer Name, Gasper Ivory MD   Provider Certification General risk, such as traffic hazards, adverse weather conditions, rough terrain or turbulence, possible failure of equipment (including vehicle or aircraft), or consequences of actions of persons outside the control of the transport personnel      RN Documentation    [address] Patel Name, [address]  SLA   Level of Care Advanced life support      Follow-up Information    None       Patient's Medications    No medications on file     No discharge procedures on file         ED Provider  Electronically Signed by     Brandon Ballard MD  04/28/23 0749

## 2023-04-29 ENCOUNTER — APPOINTMENT (OUTPATIENT)
Dept: RADIOLOGY | Facility: HOSPITAL | Age: 38
End: 2023-04-29

## 2023-04-29 PROBLEM — R00.0 SINUS TACHYCARDIA: Status: RESOLVED | Noted: 2023-04-28 | Resolved: 2023-04-29

## 2023-04-29 PROBLEM — R50.9 FEVER OF UNKNOWN ORIGIN: Status: RESOLVED | Noted: 2023-04-28 | Resolved: 2023-04-29

## 2023-04-29 LAB
ANION GAP SERPL CALCULATED.3IONS-SCNC: 9 MMOL/L (ref 4–13)
BUN SERPL-MCNC: 15 MG/DL (ref 5–25)
CALCIUM SERPL-MCNC: 8.4 MG/DL (ref 8.4–10.2)
CHLORIDE SERPL-SCNC: 107 MMOL/L (ref 96–108)
CO2 SERPL-SCNC: 22 MMOL/L (ref 21–32)
CREAT SERPL-MCNC: 0.87 MG/DL (ref 0.6–1.3)
GFR SERPL CREATININE-BSD FRML MDRD: 108 ML/MIN/1.73SQ M
GLUCOSE SERPL-MCNC: 80 MG/DL (ref 65–140)
MAGNESIUM SERPL-MCNC: 2.3 MG/DL (ref 1.9–2.7)
POTASSIUM SERPL-SCNC: 3.6 MMOL/L (ref 3.5–5.3)
SODIUM SERPL-SCNC: 138 MMOL/L (ref 135–147)

## 2023-04-29 RX ORDER — ENOXAPARIN SODIUM 100 MG/ML
40 INJECTION SUBCUTANEOUS
Status: DISCONTINUED | OUTPATIENT
Start: 2023-04-29 | End: 2023-04-30 | Stop reason: HOSPADM

## 2023-04-29 RX ORDER — ACETAMINOPHEN 325 MG/1
650 TABLET ORAL EVERY 6 HOURS PRN
Status: DISCONTINUED | OUTPATIENT
Start: 2023-04-29 | End: 2023-04-30 | Stop reason: HOSPADM

## 2023-04-29 RX ORDER — LANOLIN ALCOHOL/MO/W.PET/CERES
3 CREAM (GRAM) TOPICAL
Status: DISCONTINUED | OUTPATIENT
Start: 2023-04-29 | End: 2023-04-30 | Stop reason: HOSPADM

## 2023-04-29 RX ORDER — BENZONATATE 100 MG/1
100 CAPSULE ORAL 3 TIMES DAILY PRN
Status: DISCONTINUED | OUTPATIENT
Start: 2023-04-29 | End: 2023-04-30 | Stop reason: HOSPADM

## 2023-04-29 RX ADMIN — CHLORHEXIDINE GLUCONATE 0.12% ORAL RINSE 15 ML: 1.2 LIQUID ORAL at 09:45

## 2023-04-29 RX ADMIN — ENOXAPARIN SODIUM 40 MG: 100 INJECTION SUBCUTANEOUS at 09:45

## 2023-04-29 RX ADMIN — BENZONATATE 100 MG: 100 CAPSULE ORAL at 20:37

## 2023-04-29 RX ADMIN — Medication 3 MG: at 23:10

## 2023-04-29 RX ADMIN — ACETAMINOPHEN 650 MG: 325 TABLET ORAL at 20:37

## 2023-04-29 NOTE — PLAN OF CARE
Problem: INFECTION - ADULT  Goal: Absence or prevention of progression during hospitalization  Description: INTERVENTIONS:  - Assess and monitor for signs and symptoms of infection  - Monitor lab/diagnostic results  - Monitor all insertion sites, i e  indwelling lines, tubes, and drains  - Monitor endotracheal if appropriate and nasal secretions for changes in amount and color  - [place] appropriate cooling/warming therapies per order  - Administer medications as ordered  - Instruct and encourage patient and family to use good hand hygiene technique  - Identify and instruct in appropriate isolation precautions for identified infection/condition  Outcome: Progressing  Goal: Absence of fever/infection during neutropenic period  Description: INTERVENTIONS:  - Monitor WBC    Outcome: Progressing     Problem: RESPIRATORY - ADULT  Goal: Achieves optimal ventilation and oxygenation  Description: INTERVENTIONS:  - Assess for changes in respiratory status  - Assess for changes in mentation and behavior  - Position to facilitate oxygenation and minimize respiratory effort  - Oxygen administered by appropriate delivery if ordered  - Initiate smoking cessation education as indicated  - Encourage broncho-pulmonary hygiene including cough, deep breathe, Incentive Spirometry  - Assess the need for suctioning and aspirate as needed  - Assess and instruct to report SOB or any respiratory difficulty  - Respiratory Therapy support as indicated  Outcome: Progressing     Problem: Knowledge Deficit  Goal: Patient/family/caregiver demonstrates understanding of disease process, treatment plan, medications, and discharge instructions  Description: Complete learning assessment and assess knowledge base    Interventions:  - Provide teaching at level of understanding  - Provide teaching via preferred learning methods  Outcome: Progressing     Problem: METABOLIC, FLUID AND ELECTROLYTES - ADULT  Goal: Electrolytes maintained within normal limits  Description: INTERVENTIONS:  - Monitor labs and assess patient for signs and symptoms of electrolyte imbalances  - Administer electrolyte replacement as ordered  - Monitor response to electrolyte replacements, including repeat lab results as appropriate  - Instruct patient on fluid and nutrition as appropriate  Outcome: Progressing  Goal: Fluid balance maintained  Description: INTERVENTIONS:  - Monitor labs   - Monitor I/O and WT  - Instruct patient on fluid and nutrition as appropriate  - Assess for signs & symptoms of volume excess or deficit  Outcome: Progressing  Goal: Glucose maintained within target range  Description: INTERVENTIONS:  - Monitor Blood Glucose as ordered  - Assess for signs and symptoms of hyperglycemia and hypoglycemia  - Administer ordered medications to maintain glucose within target range  - Assess nutritional intake and initiate nutrition service referral as needed  Outcome: Progressing

## 2023-04-29 NOTE — PROGRESS NOTES
"[address] [place] [address]  Progress Note: Critical Care  Name: Juwan Awad 42 y o  male I MRN: [id number]  Unit/Bed#: ICU 04 I Date of Admission: 4/28/2023   Date of Service: 4/29/2023 I Hospital Day: 1  ----------------------------------------------------------------------------------------  HPI:  \" Juwan Awad is a 42 y o  male who presents as a transfer from [address] [place] [address]  Not much is known about the patient  He was found facedown on a stairway and unresponsive  EMS was activated and the patient was transported to [address] [facility] emergency department  He was given 10 mg of Narcan in the emergency department which caused him to begin to wake up  At that time he admitted to using heroin and crack cocaine  He shortly became unresponsive again and was started on a Narcan drip      Upon arrival to [facility] patient does not respond to loud voice or noxious stimuli just grunting and moving away  He is protecting his airway and respirating without difficulty  He is not responding to questions at all   \"    Recent Events:  · 4/28 Narcan gtt d/c around 1400      Overnight Events:  · Remained off Narcan gtt  · No acute events     Goals For Today:  · Transfer out of the unit, v d/c, v rehab     ---------------------------------------------------------------------------------------    Assessment/Plan      * Opioid overdose ([place] [address] )  Assessment & Plan  · Patient admits to using heroin and cocaine  · Requiring a Narcan drip which has been off since around 1400 on 4/28  · Transfer from [facility]  · Admit to the ICU    Acute pulmonary edema ([place] [address] )  Assessment & Plan  · Present on admission to the emergency department at [facility]  · Resolved with oxygen administration      Atrial fibrillation ([address] )  Assessment & Plan  · History of atrial fibrillation   · Sinus tachycardia at this time  · Continuous cardiopulmonary monitoring  · Unknown home " medications  · Anticoagulate if needed      Respiratory failure with hypoxia [facility])  Assessment & Plan  · Secondary to opioid overdose   · Resolved with Narcan drip  · Admitted to the ICU  · Continuous cardiopulmonary monitoring    Sinus tachycardia-resolved as of 4/29/2023  Assessment & Plan  · Likely secondary to febrile state   · Tylenol to treat fever  · Monitor heart rate    Fever of unknown origin-resolved as of 4/29/2023  Assessment & Plan  · Temperature 101  · Rectal Tylenol ordered  · Every 4 hours vital signs  · Check blood cultures        Disposition: Med Surg    ICU Core Measures     A: Assess, Prevent, and Manage Pain · Has pain been assessed? Yes  · Need for changes to pain regimen? Yes   B: Both SAT/SAT  · N/A   C: Choice of Sedation · RASS Goal: 0 Alert and Calm or N/A patient not on sedation  · Need for changes to sedation or analgesia regimen? NA   D: Delirium · CAM-ICU: Negative   E: Early Mobility  · Plan for early mobility? Yes   F: Family Engagement · Plan for family engagement today? Yes       Review of Invasive Devices: Momin Plan: Momin to be removed  Order has been placed        Prophylaxis:  VTE Lovenox    Stress Ulcer  not ordered          Subjective     Review of Systems   Constitutional: Negative for fatigue and fever  Respiratory: Negative for shortness of breath  Cardiovascular: Negative for chest pain and palpitations  Gastrointestinal: Negative for abdominal pain  All other systems reviewed and are negative           Objective                            Vitals I/O      Most Recent Min/Max in 24hrs   Temp 99 1 °F (37 3 °C) Temp  Min: 97 3 °F (36 3 °C)  Max: 101 8 °F (38 8 °C)   Pulse 78 Pulse  Min: 2  Max: 133   Resp (!) 24 Resp  Min: 15  Max: 40   BP (!) 84/54 BP  Min: 84/63  Max: 152/64   O2 Sat 96 % SpO2  Min: 85 %  Max: 100 %      Intake/Output Summary (Last 24 hours) at 4/29/2023 0106  Last data filed at 4/28/2023 2200  Gross per 24 hour   Intake 123 34 ml   Output 625 ml   Net -501 66 ml         Diet Regular; Regular House     Invasive Monitoring Physical exam    Physical Exam  Vitals reviewed  Constitutional:       General: He is not in acute distress  Appearance: He is not ill-appearing, toxic-appearing or diaphoretic  HENT:      Head: Normocephalic and atraumatic  Eyes:      General: Gaze aligned appropriately  Cardiovascular:      Rate and Rhythm: Normal rate and regular rhythm  Pulmonary:      Effort: Pulmonary effort is normal       Breath sounds: Normal breath sounds  Abdominal:      General: There is no distension  Palpations: Abdomen is soft  Musculoskeletal:      Cervical back: Normal range of motion  Neurological:      General: No focal deficit present  Mental Status: He is alert and oriented to person, place, and time  GCS: GCS eye subscore is 4  GCS verbal subscore is 5  GCS motor subscore is 6  Diagnostic Studies      EKG: reviewed   Imaging:  I have personally reviewed pertinent reports         Medications:  Scheduled PRN   chlorhexidine, 15 mL, Q12H MAYELA  enoxaparin, 40 mg, Q24H MAYELA      acetaminophen, 650 mg, Q4H PRN       Continuous          Labs:    CBC    Recent Labs     04/28/23  0550   WBC 8 54   HGB 12 4   HCT 40 6        BMP    Recent Labs     04/28/23  0550   SODIUM 135   K 3 8   CL 99   CO2 20*   AGAP 16*   BUN 18   CREATININE 1 33*   CALCIUM 8 9       Coags    No recent results     Additional Electrolytes  No recent results       Blood Gas    No recent results  No recent results LFTs  Recent Labs     04/28/23  0550   ALT 19   AST 25   ALKPHOS 84   ALB 4 0   TBILI 0 36       Infectious  No recent results  Glucose  Recent Labs     04/28/23  0550   GLUC [address]

## 2023-04-29 NOTE — CONSULTS
Consultation- Orthopedics   Yony Lawrence 42 y o  male MRN: [id number]  Unit/Bed#: ICU 4-01      Chief Complaint:   right shoulder pain    HPI:   42 y o male currently in the ICU for overdose  Imaging work-up revealed a non-united right clavicle fracture and Orthopedics was consulted  Currently the patient states he sustained a right clavicle fracture in 2018 but never sought medical attention  No surgery for this  He states it healed but since that time he has experienced dull achy pain in the right clavicle/shoulder region especially with movement  No recent trauma  No pain at rest   Denies any RUE numbness/tingling  He states it is not bothering him currently  Review Of Systems:   · Skin: Normal  · Neuro: See HPI  · Musculoskeletal: See HPI  · 14 point review of systems negative except as stated above     Past Medical History:   Past Medical History:   Diagnosis Date    Atrial fibrillation ([facility] )     Fever of unknown origin 4/28/2023    Rectal Tylenol ordered       Past Surgical History:   No past surgical history on file  Family History:  Family history reviewed and non-contributory  No family history on file      Social History:  Social History     Socioeconomic History    Marital status: Single     Spouse name: Not on file    Number of children: Not on file    Years of education: Not on file    Highest education level: Not on file   Occupational History    Not on file   Tobacco Use    Smoking status: Every Day     Types: Cigarettes    Smokeless tobacco: Never   Vaping Use    Vaping Use: Never used   Substance and Sexual Activity    Alcohol use: Not Currently    Drug use: Yes     Comment: K2    Sexual activity: Not on file   Other Topics Concern    Not on file   Social History Narrative    Not on file     Social Determinants of Health     Financial Resource Strain: Not on file   Food Insecurity: Not on file   Transportation Needs: Not on file   Physical Activity: Not on file   Stress: Not on file   Social Connections: Not on file   Intimate Partner Violence: Not on file   Housing Stability: Not on file       Allergies: Allergies   Allergen Reactions    Robaxin [Methocarbamol] Other (See Comments)     Not specified           Labs:  0   Lab Value Date/Time    HCT 40 6 04/28/2023 0550    HGB 12 4 04/28/2023 0550    WBC 8 54 04/28/2023 0550       Meds:    Current Facility-Administered Medications:     chlorhexidine (PERIDEX) 0 12 % oral rinse 15 mL, 15 mL, Mouth/Throat, Q12H MAYELA, Vinton Bellaire, CRNP, 15 mL at 04/29/23 0945    enoxaparin (LOVENOX) subcutaneous injection 40 mg, 40 mg, Subcutaneous, [address], Darcy Dallas, CRNP, 40 mg at 04/29/23 0945    Blood Culture:   Lab Results   Component Value Date    BLOODCX Received in Microbiology Lab  Culture in Progress  04/28/2023    BLOODCX Received in Microbiology Lab  Culture in Progress  04/28/2023       Wound Culture:   No results found for: WOUNDCULT    Ins and Outs:  I/O last 24 hours: In: 883 3 [P O :760; I V :123 3]  Out: 1125 [Urine:1125]          Physical Exam:   /58   Pulse 82   Temp 98 2 °F (36 8 °C) (Oral)   Resp 22   Wt 83 4 kg (183 lb 13 8 oz)   SpO2 94%   Gen: No acute distress, resting comfortably in bed  HEENT: Eyes clear, moist mucus membranes, hearing intact  Respiratory: No audible wheezing or stridor  Cardiovascular: Well Perfused peripherally, 2+ distal pulse  Abdomen: nondistended, no peritoneal signs  Musculoskeletal: right upper extremity  · Skin intact, No tenting  No ecchymosis or swelling noted over shoulder  · Non-tender to palpation over clavicle  · Sensation intact to axillary, median, radial, and ulnar nerve distributions  · Motor intact to median, radial, and ulnar nerve distributions  · 2+ radial and ulnar pulse  · Upper extremity is warm and well perfused    Radiology:   I personally reviewed the films    CT Cervical spine without contrast 4/28/2023  IMPRESSION:     No acute compression collapse vertebra  Deformity of the right clavicle with a nonunited fracture  Correlate clinically to exclude any acute injury    Correlation with the radiograph of the clavicle suggest    Right clavicle x-rays currently ordered and pending   _*_*_*_*_*_*_*_*_*_*_*_*_*_*_*_*_*_*_*_*_*_*_*_*_*_*_*_*_*_*_*_*_*_*_*_*_*_*_*_*_*    Assessment:  42 y o male with a history of old right clavicle fracture sustained in 2018  Plan:   · Obtain dedicated right clavicle x-rays for further evaluation  · Weight bearing as tolerated right upper extremity  · No sling immobilization necessary  · Analgesics for pain as needed  · PT/OT  · Ortho will follow-up on plain films and make recommendations thereafter, likely to include outpatient therapy to strengthen shoulder joint          Gladys Garcia PA-C

## 2023-04-29 NOTE — PLAN OF CARE
Problem: MOBILITY - ADULT  Goal: Maintain or return to baseline ADL function  Description: INTERVENTIONS:  -  Assess patient's ability to carry out ADLs; assess patient's baseline for ADL function and identify physical deficits which impact ability to perform ADLs (bathing, care of mouth/teeth, toileting, grooming, dressing, etc )  - Assess/evaluate cause of self-care deficits   - Assess range of motion  - Assess patient's mobility; develop plan if impaired  - Assess patient's need for assistive devices and provide as appropriate  - Encourage maximum independence but intervene and supervise when necessary  - Involve family in performance of ADLs  - Assess for home care needs following discharge   - Consider OT consult to assist with ADL evaluation and planning for discharge  - Provide patient education as appropriate  Outcome: Progressing  Goal: Maintains/Returns to pre admission functional level  Description: INTERVENTIONS:  - Perform BMAT or MOVE assessment daily    - Set and communicate daily mobility goal to care team and patient/family/caregiver  - Collaborate with rehabilitation services on mobility goals if consulted  - Perform Range of Motion 8 times a day  - Reposition patient every 2 hours    - Dangle patient 3 times a day  - Stand patient 3 times a day  - Ambulate patient 3 times a day  - Out of bed to chair 3 times a day   - Out of bed for meals 3 times a day  - Out of bed for toileting  - Record patient progress and toleration of activity level   Outcome: Progressing     Problem: PAIN - ADULT  Goal: Verbalizes/displays adequate comfort level or baseline comfort level  Description: Interventions:  - Encourage patient to monitor pain and request assistance  - Assess pain using appropriate pain scale  - Administer analgesics based on type and severity of pain and evaluate response  - Implement non-pharmacological measures as appropriate and evaluate response  - Consider cultural and social influences on pain and pain management  - Notify physician/advanced practitioner if interventions unsuccessful or patient reports new pain  Outcome: Progressing     Problem: INFECTION - ADULT  Goal: Absence or prevention of progression during hospitalization  Description: INTERVENTIONS:  - Assess and monitor for signs and symptoms of infection  - Monitor lab/diagnostic results  - Monitor all insertion sites, i e  indwelling lines, tubes, and drains  - Monitor endotracheal if appropriate and nasal secretions for changes in amount and color  - [place] appropriate cooling/warming therapies per order  - Administer medications as ordered  - Instruct and encourage patient and family to use good hand hygiene technique  - Identify and instruct in appropriate isolation precautions for identified infection/condition  Outcome: Progressing  Goal: Absence of fever/infection during neutropenic period  Description: INTERVENTIONS:  - Monitor WBC    Outcome: Progressing     Problem: SAFETY ADULT  Goal: Maintain or return to baseline ADL function  Description: INTERVENTIONS:  -  Assess patient's ability to carry out ADLs; assess patient's baseline for ADL function and identify physical deficits which impact ability to perform ADLs (bathing, care of mouth/teeth, toileting, grooming, dressing, etc )  - Assess/evaluate cause of self-care deficits   - Assess range of motion  - Assess patient's mobility; develop plan if impaired  - Assess patient's need for assistive devices and provide as appropriate  - Encourage maximum independence but intervene and supervise when necessary  - Involve family in performance of ADLs  - Assess for home care needs following discharge   - Consider OT consult to assist with ADL evaluation and planning for discharge  - Provide patient education as appropriate  Outcome: Progressing  Goal: Maintains/Returns to pre admission functional level  Description: INTERVENTIONS:  - Perform BMAT or MOVE assessment daily    - Set and communicate daily mobility goal to care team and patient/family/caregiver  - Collaborate with rehabilitation services on mobility goals if consulted  - Perform Range of Motion 8 times a day  - Reposition patient every 2 hours    - Dangle patient 3 times a day  - Stand patient 3 times a day  - Ambulate patient 3 times a day  - Out of bed to chair 3 times a day   - Out of bed for meals 3 times a day  - Out of bed for toileting  - Record patient progress and toleration of activity level   Outcome: Progressing  Goal: Patient will remain free of falls  Description: INTERVENTIONS:  - Educate patient/family on patient safety including physical limitations  - Instruct patient to call for assistance with activity   - Consult OT/PT to assist with strengthening/mobility   - Keep Call bell within reach  - Keep bed low and locked with side rails adjusted as appropriate  - Keep care items and personal belongings within reach  - Initiate and maintain comfort rounds  - Make Fall Risk Sign visible to staff  - Offer Toileting every 2 Hours, in advance of need  - Initiate/Maintain bed alarm  - Obtain necessary fall risk management equipment:  - Apply yellow socks and bracelet for high fall risk patients  - Consider moving patient to room near nurses station  Outcome: Progressing     Problem: DISCHARGE PLANNING  Goal: Discharge to home or other facility with appropriate resources  Description: INTERVENTIONS:  - Identify barriers to discharge w/patient and caregiver  - Arrange for needed discharge resources and transportation as appropriate  - Identify discharge learning needs (meds, wound care, etc )  - Arrange for interpretive services to assist at discharge as needed  - Refer to Case Management Department for coordinating discharge planning if the patient needs post-hospital services based on physician/advanced practitioner order or complex needs related to functional status, cognitive ability, or social support system  Outcome: Progressing     Problem: Knowledge Deficit  Goal: Patient/family/caregiver demonstrates understanding of disease process, treatment plan, medications, and discharge instructions  Description: Complete learning assessment and assess knowledge base    Interventions:  - Provide teaching at level of understanding  - Provide teaching via preferred learning methods  Outcome: Progressing     Problem: RESPIRATORY - ADULT  Goal: Achieves optimal ventilation and oxygenation  Description: INTERVENTIONS:  - Assess for changes in respiratory status  - Assess for changes in mentation and behavior  - Position to facilitate oxygenation and minimize respiratory effort  - Oxygen administered by appropriate delivery if ordered  - Initiate smoking cessation education as indicated  - Encourage broncho-pulmonary hygiene including cough, deep breathe, Incentive Spirometry  - Assess the need for suctioning and aspirate as needed  - Assess and instruct to report SOB or any respiratory difficulty  - Respiratory Therapy support as indicated  Outcome: Progressing     Problem: METABOLIC, FLUID AND ELECTROLYTES - ADULT  Goal: Electrolytes maintained within normal limits  Description: INTERVENTIONS:  - Monitor labs and assess patient for signs and symptoms of electrolyte imbalances  - Administer electrolyte replacement as ordered  - Monitor response to electrolyte replacements, including repeat lab results as appropriate  - Instruct patient on fluid and nutrition as appropriate  Outcome: Progressing  Goal: Fluid balance maintained  Description: INTERVENTIONS:  - Monitor labs   - Monitor I/O and WT  - Instruct patient on fluid and nutrition as appropriate  - Assess for signs & symptoms of volume excess or deficit  Outcome: Progressing  Goal: Glucose maintained within target range  Description: INTERVENTIONS:  - Monitor Blood Glucose as ordered  - Assess for signs and symptoms of hyperglycemia and hypoglycemia  - Administer ordered medications to maintain glucose within target range  - Assess nutritional intake and initiate nutrition service referral as needed  Outcome: Progressing

## 2023-04-30 VITALS
DIASTOLIC BLOOD PRESSURE: 97 MMHG | TEMPERATURE: 98.3 F | HEART RATE: 66 BPM | WEIGHT: 183.64 LBS | OXYGEN SATURATION: 100 % | RESPIRATION RATE: 16 BRPM | SYSTOLIC BLOOD PRESSURE: 158 MMHG

## 2023-04-30 LAB
ANION GAP SERPL CALCULATED.3IONS-SCNC: 6 MMOL/L (ref 4–13)
BASOPHILS # BLD AUTO: 0.05 THOUSANDS/ÂΜL (ref 0–0.1)
BASOPHILS NFR BLD AUTO: 1 % (ref 0–1)
BUN SERPL-MCNC: 10 MG/DL (ref 5–25)
CALCIUM SERPL-MCNC: 8.5 MG/DL (ref 8.4–10.2)
CHLORIDE SERPL-SCNC: 106 MMOL/L (ref 96–108)
CO2 SERPL-SCNC: 26 MMOL/L (ref 21–32)
CREAT SERPL-MCNC: 0.78 MG/DL (ref 0.6–1.3)
EOSINOPHIL # BLD AUTO: 0.42 THOUSAND/ÂΜL (ref 0–0.61)
EOSINOPHIL NFR BLD AUTO: 4 % (ref 0–6)
ERYTHROCYTE [DISTWIDTH] IN BLOOD BY AUTOMATED COUNT: 12.9 % (ref 11.6–15.1)
GFR SERPL CREATININE-BSD FRML MDRD: 113 ML/MIN/1.73SQ M
GLUCOSE SERPL-MCNC: 117 MG/DL (ref 65–140)
HCT VFR BLD AUTO: 32.7 % (ref 36.5–49.3)
HGB BLD-MCNC: 10.6 G/DL (ref 12–17)
IMM GRANULOCYTES # BLD AUTO: 0.04 THOUSAND/UL (ref 0–0.2)
IMM GRANULOCYTES NFR BLD AUTO: 0 % (ref 0–2)
LYMPHOCYTES # BLD AUTO: 2.53 THOUSANDS/ÂΜL (ref 0.6–4.47)
LYMPHOCYTES NFR BLD AUTO: 25 % (ref 14–44)
MCH RBC QN AUTO: 29.4 PG (ref 26.8–34.3)
MCHC RBC AUTO-ENTMCNC: 32.4 G/DL (ref 31.4–37.4)
MCV RBC AUTO: 91 FL (ref 82–98)
MONOCYTES # BLD AUTO: 0.52 THOUSAND/ÂΜL (ref 0.17–1.22)
MONOCYTES NFR BLD AUTO: 5 % (ref 4–12)
NEUTROPHILS # BLD AUTO: 6.76 THOUSANDS/ÂΜL (ref 1.85–7.62)
NEUTS SEG NFR BLD AUTO: 65 % (ref 43–75)
NRBC BLD AUTO-RTO: 0 /100 WBCS
PLATELET # BLD AUTO: 271 THOUSANDS/UL (ref 149–390)
PMV BLD AUTO: 9.9 FL (ref 8.9–12.7)
POTASSIUM SERPL-SCNC: 3.5 MMOL/L (ref 3.5–5.3)
RBC # BLD AUTO: 3.61 MILLION/UL (ref 3.88–5.62)
SODIUM SERPL-SCNC: 138 MMOL/L (ref 135–147)
WBC # BLD AUTO: 10.32 THOUSAND/UL (ref 4.31–10.16)

## 2023-04-30 RX ORDER — BENZONATATE 100 MG/1
100 CAPSULE ORAL 3 TIMES DAILY PRN
Qty: 20 CAPSULE | Refills: 0
Start: 2023-04-30

## 2023-04-30 RX ORDER — LANOLIN ALCOHOL/MO/W.PET/CERES
3 CREAM (GRAM) TOPICAL
Refills: 0
Start: 2023-04-30

## 2023-04-30 RX ORDER — OXYCODONE HYDROCHLORIDE 10 MG/1
10 TABLET ORAL EVERY 4 HOURS PRN
Status: DISCONTINUED | OUTPATIENT
Start: 2023-04-30 | End: 2023-04-30 | Stop reason: HOSPADM

## 2023-04-30 RX ORDER — LORAZEPAM 1 MG/1
1 TABLET ORAL EVERY 6 HOURS PRN
Refills: 0
Start: 2023-04-30 | End: 2023-05-10

## 2023-04-30 RX ORDER — OXYCODONE HYDROCHLORIDE 5 MG/1
5 TABLET ORAL EVERY 4 HOURS PRN
Refills: 0
Start: 2023-04-30 | End: 2023-05-10

## 2023-04-30 RX ORDER — OXYCODONE HYDROCHLORIDE 5 MG/1
5 TABLET ORAL EVERY 4 HOURS PRN
Status: DISCONTINUED | OUTPATIENT
Start: 2023-04-30 | End: 2023-04-30 | Stop reason: HOSPADM

## 2023-04-30 RX ORDER — LORAZEPAM 1 MG/1
1 TABLET ORAL EVERY 6 HOURS PRN
Status: DISCONTINUED | OUTPATIENT
Start: 2023-04-30 | End: 2023-04-30 | Stop reason: HOSPADM

## 2023-04-30 RX ADMIN — BENZONATATE 100 MG: 100 CAPSULE ORAL at 08:22

## 2023-04-30 RX ADMIN — OXYCODONE 5 MG: 5 TABLET ORAL at 13:16

## 2023-04-30 RX ADMIN — ACETAMINOPHEN 650 MG: 325 TABLET ORAL at 08:22

## 2023-04-30 NOTE — CASE MANAGEMENT
Case Management Assessment & Discharge Planning Note    Patient name Antelmo Willis  Location [address] [place] [phone]-* MRN [id number]  : 1985 Date 2023       Current Admission Date: 2023  Current Admission Diagnosis:Opioid overdose [facility])   Patient Active Problem List    Diagnosis Date Noted    Opioid overdose ([facility] ) 2023    Respiratory failure with hypoxia ([facility] ) 2023    Atrial fibrillation ([facility] ) 2023    Acute pulmonary edema ([facility] ) 2023      LOS (days): 2  Geometric Mean LOS (GMLOS) (days): 3 60  Days to GMLOS:1 5     OBJECTIVE:    Risk of Unplanned Readmission Score: 10 72         Current admission status: Inpatient       Preferred Pharmacy:   [address] [place] [id number]  Phone: [phone] Fax: [phone]    Primary Care Provider: No primary care provider on file  Primary Insurance:   Secondary Insurance:     ASSESSMENT:  Active Health Care Proxies    There are no active Health Care Proxies on file         Readmission Root Cause  30 Day Readmission: No    Patient Information  Admitted from[de-identified] Other (comment) ([place] [address])  Mental Status: Alert  During Assessment patient was accompanied by: Not accompanied during assessment  Assessment information provided by[de-identified] Patient  Primary Caregiver: Self  Support Systems: Family members (Sister & Brother)  [place] of Residence: [address] do you live in?: [place]  Type of Current Residence: Other (Comment) ([address]  [facility])  Homeless/housing insecurity resource given?:  (Patient is residing at [place] [address] and connected with García Cyr)    Activities of Daily Living Prior to Admission  Functional Status: Independent  Completes ADLs independently?: Yes  Ambulates independently?: Yes  Does patient use assisted devices?: No  Does patient currently own DME?: No  Does patient have a history of Outpatient Therapy (PT/OT)?: No  Does the patient have a history of Short-Term Rehab?: No  Does patient have a history of HHC?: No  Does patient currently have Matt ?: No    Patient Information Continued  Income Source: Government aid  Does patient have prescription coverage?: Yes  Does patient have a history of substance abuse?: Yes  Historical substance use preference: Cocaine, Heroin, Other (Opiates)  Is patient currently in treatment for substance abuse?: No  Treatment options provided     Means of Transportation  [facility]        DISCHARGE DETAILS:    Discharge planning discussed with[de-identified] Patient  Freedom of Choice: Yes  Comments - Freedom of Choice: Pt agreeable to inpatient substance use treatment    Were Treatment Team discharge recommendations reviewed with patient/caregiver?: Yes  Did patient/caregiver verbalize understanding of patient care needs?: Yes  Were patient/caregiver advised of the risks associated with not following Treatment Team discharge recommendations?: Yes    Contacts  Patient Contacts: None Listed      Discharge Destination Plan[de-identified] Substance Abuse Treatment  Transport at Discharge : Auto with designated  (provided by treatment center)    Additional Comments: CM Consulted for ip substance use treatment  CM referred Pt to hospitals who subsequently connected Pt with [facility] where Pt has been accepted  [facility] will send their transport to bring Pt to the [facility] facility  LUIS ALBERTO & RN informed of same  CM remains available through discharge

## 2023-04-30 NOTE — ASSESSMENT & PLAN NOTE
Heart rate well controlled off medications  Not on chronic anticoagulation  DVT prophylaxis with Lovenox  Monitor and initiate rate control medications as needed

## 2023-04-30 NOTE — DISCHARGE SUMMARY
[facility]  Discharge- Rolena Hashimoto 1985, 42 y o  male MRN: [id number]  Unit/Bed#: E5 -01 Encounter: [id number]  Primary Care Provider: No primary care provider on file  Date and time admitted to hospital: 4/28/2023 12:39 PM    * Opioid overdose [facility])  Assessment & Plan  Patient found down and given Narcan with good effect; brought to [address] [place] [address], concern for airway protection; transferred for ICU support at [facility] on Narcan drip and monitored; patient much improved  Labs and vitals currently stable U tox showed cocaine and opiates  Review of medications on most recent [facility] list shows as needed Ativan for anxiety  Patient requesting rehab    Atrial fibrillation ([facility] [address] )  Assessment & Plan  Heart rate well controlled off medications  Not on chronic anticoagulation  DVT prophylaxis with Lovenox  Monitor and initiate rate control medications as needed    Respiratory failure with hypoxia [facility])  Assessment & Plan  Resolved; patient currently 95% SPO2 on room air        Discharging Physician / Practitioner: Estella Carpio MD  PCP: No primary care provider on file    Admission Date:   Admission Orders (From admission, onward)     Ordered        04/28/23 1341  Inpatient Admission  Once                      Discharge Date: 04/30/23    Medical Problems     Resolved Problems  Date Reviewed: 4/30/2023          Resolved    Fever of unknown origin 4/29/2023     Resolved by  VERONICA Clemons    Overview Signed 4/28/2023 12:57 PM by VERONICA Dior     · Rectal Tylenol ordered         Sinus tachycardia 4/29/2023     Resolved by  Christi Osborne, [address] [place] [address] Stay:  · Critical care    Procedures Performed:   · X-ray clavicle  · X-ray chest  · CT head  · CT cervical spine    Significant Findings / Test Results:   · Opiate overdose  · Respiratory failure    Incidental Findings:   · None    Test Results Pending at "Discharge (will require follow up): · None     Outpatient Tests Requested:  · CBC/CMP    Complications: None    Reason for Admission: Found down/opiate overdose    Hospital Course: As per HPI:    \"Ronald Machado is a 42 y o  male who presents as a transfer from [place] [address]  Not much is known about the patient  He was found facedown on a stairway and unresponsive  EMS was activated and the patient was transported to [address] [facility] emergency department  He was given 10 mg of Narcan in the emergency department which caused him to begin to wake up  At that time he admitted to using heroin and crack cocaine  He shortly became unresponsive again and was started on a Narcan drip      Upon arrival to [facility] - CLOSED patient does not respond to loud voice or noxious stimuli just grunting and moving away  He is protecting his airway and respirating without difficulty  He is not responding to questions at all  \"    Condition at Discharge: stable     Discharge Day Visit / Exam:     Subjective: Patient brought to the hospital after being found down; responded to Narcan and U tox showed opiates and cocaine  Presuming opiate overdose and patient placed on Narcan drip while in the ICU  Today, labs and vitals stable; some concern for white count double up to K; but overall 0/4 SIRS criteria  White count likely secondary to stress of overdose  Monitor off antibiotics  Patient requesting inpatient rehab; patient excepted at [facility] and will be discharged and transported tonight  Vitals: Blood Pressure: 133/63 (04/30/23 0823)  Pulse: 81 (04/30/23 0823)  Temperature: 98 5 °F (36 9 °C) (04/30/23 0823)  Temp Source: Oral (04/30/23 0823)  Respirations: 16 (04/30/23 0823)  Weight - Scale: 83 3 kg (183 lb 10 3 oz) (04/30/23 0600)  SpO2: 95 % (04/30/23 4478)  Exam:   Physical Exam  Vitals and nursing note reviewed  Constitutional:       General: He is not in acute distress       " Appearance: He is well-developed  HENT:      Head: Normocephalic and atraumatic  Eyes:      Conjunctiva/sclera: Conjunctivae normal    Cardiovascular:      Rate and Rhythm: Normal rate  Heart sounds: No murmur heard  Pulmonary:      Effort: Pulmonary effort is normal  No respiratory distress  Abdominal:      Palpations: Abdomen is soft  Tenderness: There is no abdominal tenderness  Musculoskeletal:         General: No swelling  Cervical back: Neck supple  Skin:     General: Skin is warm and dry  Neurological:      Mental Status: He is alert and oriented to person, place, and time  Psychiatric:         Mood and Affect: Mood normal            Discharge instructions/Information to patient and family:   See after visit summary for information provided to patient and family  Provisions for Follow-Up Care:  See after visit summary for information related to follow-up care and any pertinent home health orders  Disposition:     Acute Rehab at [facility]    For Discharges to [facility] SNF:   · Not Applicable to this Patient - Not Applicable to this Patient    Planned Readmission: None     Discharge Statement:  I spent 45 minutes discharging the patient  This time was spent on the day of discharge  I had direct contact with the patient on the day of discharge  Greater than 50% of the total time was spent examining patient, answering all patient questions, arranging and discussing plan of care with patient as well as directly providing post-discharge instructions  Additional time then spent on discharge activities  Discharge Medications:  See after visit summary for reconciled discharge medications provided to patient and family        ** Please Note: This note has been constructed using a voice recognition system **

## 2023-04-30 NOTE — PLAN OF CARE
Problem: MOBILITY - ADULT  Goal: Maintain or return to baseline ADL function  Description: INTERVENTIONS:  -  Assess patient's ability to carry out ADLs; assess patient's baseline for ADL function and identify physical deficits which impact ability to perform ADLs (bathing, care of mouth/teeth, toileting, grooming, dressing, etc )  - Assess/evaluate cause of self-care deficits   - Assess range of motion  - Assess patient's mobility; develop plan if impaired  - Assess patient's need for assistive devices and provide as appropriate  - Encourage maximum independence but intervene and supervise when necessary  - Involve family in performance of ADLs  - Assess for home care needs following discharge   - Consider OT consult to assist with ADL evaluation and planning for discharge  - Provide patient education as appropriate  Outcome: Progressing  Goal: Maintains/Returns to pre admission functional level  Description: INTERVENTIONS:  - Perform BMAT or MOVE assessment daily    - Set and communicate daily mobility goal to care team and patient/family/caregiver  - Collaborate with rehabilitation services on mobility goals if consulted  - Perform Range of Motion 8 times a day  - Reposition patient every 2 hours    - Dangle patient 3 times a day  - Stand patient 3 times a day  - Ambulate patient 3 times a day  - Out of bed to chair 3 times a day   - Out of bed for meals 3 times a day  - Out of bed for toileting  - Record patient progress and toleration of activity level   Outcome: Progressing     Problem: PAIN - ADULT  Goal: Verbalizes/displays adequate comfort level or baseline comfort level  Description: Interventions:  - Encourage patient to monitor pain and request assistance  - Assess pain using appropriate pain scale  - Administer analgesics based on type and severity of pain and evaluate response  - Implement non-pharmacological measures as appropriate and evaluate response  - Consider cultural and social influences on pain and pain management  - Notify physician/advanced practitioner if interventions unsuccessful or patient reports new pain  Outcome: Progressing     Problem: INFECTION - ADULT  Goal: Absence or prevention of progression during hospitalization  Description: INTERVENTIONS:  - Assess and monitor for signs and symptoms of infection  - Monitor lab/diagnostic results  - Monitor all insertion sites, i e  indwelling lines, tubes, and drains  - Monitor endotracheal if appropriate and nasal secretions for changes in amount and color  - [place] appropriate cooling/warming therapies per order  - Administer medications as ordered  - Instruct and encourage patient and family to use good hand hygiene technique  - Identify and instruct in appropriate isolation precautions for identified infection/condition  Outcome: Progressing     Problem: SAFETY ADULT  Goal: Maintain or return to baseline ADL function  Description: INTERVENTIONS:  -  Assess patient's ability to carry out ADLs; assess patient's baseline for ADL function and identify physical deficits which impact ability to perform ADLs (bathing, care of mouth/teeth, toileting, grooming, dressing, etc )  - Assess/evaluate cause of self-care deficits   - Assess range of motion  - Assess patient's mobility; develop plan if impaired  - Assess patient's need for assistive devices and provide as appropriate  - Encourage maximum independence but intervene and supervise when necessary  - Involve family in performance of ADLs  - Assess for home care needs following discharge   - Consider OT consult to assist with ADL evaluation and planning for discharge  - Provide patient education as appropriate  Outcome: Progressing  Goal: Maintains/Returns to pre admission functional level  Description: INTERVENTIONS:  - Perform BMAT or MOVE assessment daily    - Set and communicate daily mobility goal to care team and patient/family/caregiver     - Collaborate with rehabilitation services on mobility goals if consulted  - Perform Range of Motion 8 times a day  - Reposition patient every 2 hours    - Dangle patient 3 times a day  - Stand patient 3 times a day  - Ambulate patient 3 times a day  - Out of bed to chair 3 times a day   - Out of bed for meals 3 times a day  - Out of bed for toileting  - Record patient progress and toleration of activity level   Outcome: Progressing  Goal: Patient will remain free of falls  Description: INTERVENTIONS:  - Educate patient/family on patient safety including physical limitations  - Instruct patient to call for assistance with activity   - Consult OT/PT to assist with strengthening/mobility   - Keep Call bell within reach  - Keep bed low and locked with side rails adjusted as appropriate  - Keep care items and personal belongings within reach  - Initiate and maintain comfort rounds  - Make Fall Risk Sign visible to staff  - Offer Toileting every  Hours, in advance of need  - Initiate/Maintain alarm  - Obtain necessary fall risk management equipment:   -Apply yellow socks and bracelet for high fall risk patients  - Consider moving patient to room near nurses station  Outcome: Progressing     Problem: DISCHARGE PLANNING  Goal: Discharge to home or other facility with appropriate resources  Description: INTERVENTIONS:  - Identify barriers to discharge w/patient and caregiver  - Arrange for needed discharge resources and transportation as appropriate  - Identify discharge learning needs (meds, wound care, etc )  - Arrange for interpretive services to assist at discharge as needed  - Refer to Case Management Department for coordinating discharge planning if the patient needs post-hospital services based on physician/advanced practitioner order or complex needs related to functional status, cognitive ability, or social support system  Outcome: Progressing     Problem: Knowledge Deficit  Goal: Patient/family/caregiver demonstrates understanding of disease process, treatment plan, medications, and discharge instructions  Description: Complete learning assessment and assess knowledge base    Interventions:  - Provide teaching at level of understanding  - Provide teaching via preferred learning methods  Outcome: Progressing     Problem: RESPIRATORY - ADULT  Goal: Achieves optimal ventilation and oxygenation  Description: INTERVENTIONS:  - Assess for changes in respiratory status  - Assess for changes in mentation and behavior  - Position to facilitate oxygenation and minimize respiratory effort  - Oxygen administered by appropriate delivery if ordered  - Initiate smoking cessation education as indicated  - Encourage broncho-pulmonary hygiene including cough, deep breathe, Incentive Spirometry  - Assess the need for suctioning and aspirate as needed  - Assess and instruct to report SOB or any respiratory difficulty  - Respiratory Therapy support as indicated  Outcome: Progressing     Problem: METABOLIC, FLUID AND ELECTROLYTES - ADULT  Goal: Electrolytes maintained within normal limits  Description: INTERVENTIONS:  - Monitor labs and assess patient for signs and symptoms of electrolyte imbalances  - Administer electrolyte replacement as ordered  - Monitor response to electrolyte replacements, including repeat lab results as appropriate  - Instruct patient on fluid and nutrition as appropriate  Outcome: Progressing  Goal: Fluid balance maintained  Description: INTERVENTIONS:  - Monitor labs   - Monitor I/O and WT  - Instruct patient on fluid and nutrition as appropriate  - Assess for signs & symptoms of volume excess or deficit  Outcome: Progressing  Goal: Glucose maintained within target range  Description: INTERVENTIONS:  - Monitor Blood Glucose as ordered  - Assess for signs and symptoms of hyperglycemia and hypoglycemia  - Administer ordered medications to maintain glucose within target range  - Assess nutritional intake and initiate nutrition service referral as needed  Outcome: Progressing

## 2023-04-30 NOTE — QUICK NOTE
Right clavicle x-rays demonstrate healed fracture  No acute intervention warranted from orthopedic standpoint, he may do activities as tolerated, no restrictions or immobilization necessary  WBAT RUE  May follow-up outpatient ortho clinic as needed  Ortho signing off

## 2023-04-30 NOTE — PLAN OF CARE
Problem: MOBILITY - ADULT  Goal: Maintain or return to baseline ADL function  Description: INTERVENTIONS:  -  Assess patient's ability to carry out ADLs; assess patient's baseline for ADL function and identify physical deficits which impact ability to perform ADLs (bathing, care of mouth/teeth, toileting, grooming, dressing, etc )  - Assess/evaluate cause of self-care deficits   - Assess range of motion  - Assess patient's mobility; develop plan if impaired  - Assess patient's need for assistive devices and provide as appropriate  - Encourage maximum independence but intervene and supervise when necessary  - Involve family in performance of ADLs  - Assess for home care needs following discharge   - Consider OT consult to assist with ADL evaluation and planning for discharge  - Provide patient education as appropriate  Outcome: Progressing  Goal: Maintains/Returns to pre admission functional level  Description: INTERVENTIONS:  - Perform BMAT or MOVE assessment daily    - Set and communicate daily mobility goal to care team and patient/family/caregiver  - Collaborate with rehabilitation services on mobility goals if consulted  - Perform Range of Motion 8 times a day  - Reposition patient every 2 hours    - Dangle patient 3 times a day  - Stand patient 3 times a day  - Ambulate patient 3 times a day  - Out of bed to chair 3 times a day   - Out of bed for meals 3 times a day  - Out of bed for toileting  - Record patient progress and toleration of activity level   Outcome: Progressing     Problem: PAIN - ADULT  Goal: Verbalizes/displays adequate comfort level or baseline comfort level  Description: Interventions:  - Encourage patient to monitor pain and request assistance  - Assess pain using appropriate pain scale  - Administer analgesics based on type and severity of pain and evaluate response  - Implement non-pharmacological measures as appropriate and evaluate response  - Consider cultural and social influences on pain and pain management  - Notify physician/advanced practitioner if interventions unsuccessful or patient reports new pain  Outcome: Progressing     Problem: INFECTION - ADULT  Goal: Absence or prevention of progression during hospitalization  Description: INTERVENTIONS:  - Assess and monitor for signs and symptoms of infection  - Monitor lab/diagnostic results  - Monitor all insertion sites, i e  indwelling lines, tubes, and drains  - Monitor endotracheal if appropriate and nasal secretions for changes in amount and color  - [place] appropriate cooling/warming therapies per order  - Administer medications as ordered  - Instruct and encourage patient and family to use good hand hygiene technique  - Identify and instruct in appropriate isolation precautions for identified infection/condition  Outcome: Progressing     Problem: SAFETY ADULT  Goal: Maintain or return to baseline ADL function  Description: INTERVENTIONS:  -  Assess patient's ability to carry out ADLs; assess patient's baseline for ADL function and identify physical deficits which impact ability to perform ADLs (bathing, care of mouth/teeth, toileting, grooming, dressing, etc )  - Assess/evaluate cause of self-care deficits   - Assess range of motion  - Assess patient's mobility; develop plan if impaired  - Assess patient's need for assistive devices and provide as appropriate  - Encourage maximum independence but intervene and supervise when necessary  - Involve family in performance of ADLs  - Assess for home care needs following discharge   - Consider OT consult to assist with ADL evaluation and planning for discharge  - Provide patient education as appropriate  Outcome: Progressing  Goal: Maintains/Returns to pre admission functional level  Description: INTERVENTIONS:  - Perform BMAT or MOVE assessment daily    - Set and communicate daily mobility goal to care team and patient/family/caregiver     - Collaborate with rehabilitation services on mobility goals if consulted  - Perform Range of Motion 8 times a day  - Reposition patient every 2 hours    - Dangle patient 3 times a day  - Stand patient 3 times a day  - Ambulate patient 3 times a day  - Out of bed to chair 3 times a day   - Out of bed for meals 3 times a day  - Out of bed for toileting  - Record patient progress and toleration of activity level   Outcome: Progressing  Goal: Patient will remain free of falls  Description: INTERVENTIONS:  - Educate patient/family on patient safety including physical limitations  - Instruct patient to call for assistance with activity   - Consult OT/PT to assist with strengthening/mobility   - Keep Call bell within reach  - Keep bed low and locked with side rails adjusted as appropriate  - Keep care items and personal belongings within reach  - Initiate and maintain comfort rounds  - Make Fall Risk Sign visible to staff  - Apply yellow socks and bracelet for high fall risk patients  - Consider moving patient to room near nurses station  Outcome: Progressing     Problem: DISCHARGE PLANNING  Goal: Discharge to home or other facility with appropriate resources  Description: INTERVENTIONS:  - Identify barriers to discharge w/patient and caregiver  - Arrange for needed discharge resources and transportation as appropriate  - Identify discharge learning needs (meds, wound care, etc )  - Arrange for interpretive services to assist at discharge as needed  - Refer to Case Management Department for coordinating discharge planning if the patient needs post-hospital services based on physician/advanced practitioner order or complex needs related to functional status, cognitive ability, or social support system  Outcome: Progressing     Problem: Knowledge Deficit  Goal: Patient/family/caregiver demonstrates understanding of disease process, treatment plan, medications, and discharge instructions  Description: Complete learning assessment and assess knowledge base   Interventions:  - Provide teaching at level of understanding  - Provide teaching via preferred learning methods  Outcome: Progressing     Problem: RESPIRATORY - ADULT  Goal: Achieves optimal ventilation and oxygenation  Description: INTERVENTIONS:  - Assess for changes in respiratory status  - Assess for changes in mentation and behavior  - Position to facilitate oxygenation and minimize respiratory effort  - Oxygen administered by appropriate delivery if ordered  - Initiate smoking cessation education as indicated  - Encourage broncho-pulmonary hygiene including cough, deep breathe, Incentive Spirometry  - Assess the need for suctioning and aspirate as needed  - Assess and instruct to report SOB or any respiratory difficulty  - Respiratory Therapy support as indicated  Outcome: Progressing     Problem: METABOLIC, FLUID AND ELECTROLYTES - ADULT  Goal: Electrolytes maintained within normal limits  Description: INTERVENTIONS:  - Monitor labs and assess patient for signs and symptoms of electrolyte imbalances  - Administer electrolyte replacement as ordered  - Monitor response to electrolyte replacements, including repeat lab results as appropriate  - Instruct patient on fluid and nutrition as appropriate  Outcome: Progressing  Goal: Fluid balance maintained  Description: INTERVENTIONS:  - Monitor labs   - Monitor I/O and WT  - Instruct patient on fluid and nutrition as appropriate  - Assess for signs & symptoms of volume excess or deficit  Outcome: Progressing  Goal: Glucose maintained within target range  Description: INTERVENTIONS:  - Monitor Blood Glucose as ordered  - Assess for signs and symptoms of hyperglycemia and hypoglycemia  - Administer ordered medications to maintain glucose within target range  - Assess nutritional intake and initiate nutrition service referral as needed  Outcome: Progressing

## 2023-04-30 NOTE — ASSESSMENT & PLAN NOTE
Patient found down and given Narcan with good effect; brought to [address] [place] [address], concern for airway protection; transferred for ICU support at [facility] on Narcan drip and monitored; patient much improved  Labs and vitals currently stable U tox showed cocaine and opiates  Review of medications on most recent [facility] list shows as needed Ativan for anxiety  Patient requesting rehab

## 2023-05-01 NOTE — UTILIZATION REVIEW
Initial Clinical Review    TRANSFER FROM [place] [facility]    Admission: Date/Time/Statement:   Admission Orders (From admission, onward)     Ordered        04/28/23 1341  Inpatient Admission  Once                      Orders Placed This Encounter   Procedures    Inpatient Admission     Standing Status:   Standing     Number of Occurrences:   1     Order Specific Question:   Level of Care     Answer:   Level 1 Stepdown [13]     Order Specific Question:   Estimated length of stay     Answer:   More than 2 Midnights     Order Specific Question:   Certification     Answer:   I certify that inpatient services are medically necessary for this patient for a duration of greater than two midnights  See H&P and MD Progress Notes for additional information about the patient's course of treatment  Initial Presentation: 42 y o  male initially presented to ED at  [facility] after being  Found face down   And unresponsive  On a  Stairway  Given  Narcan in ED, woke up  Admitted to using heroin andcrack cocaine  Became  Unresponsive again and  Narcan drip started  Transferred to [facility] for further care  Not responding to voice or noxious  Stimuli or  Arrival, just grunts, moves away  Not answering any questions  Admit  Ip ICU LOC  With   Opioid overdose, Sinus  Tachycardia, Afib, fever,  Acute pulmonary edema  And respiratory failure with  Hypoxia and plan is monitor labs, blood cultures,  Narcan drip and close monitoring      4/30   Discharged to IP rehab       Wt Readings from Last 1 Encounters:   04/30/23 83 3 kg (183 lb 10 3 oz)     Additional Vital Signs:   04/29/23 1135 98 3 °F (36 8 °C) -- -- -- -- -- -- --   04/29/23 0900 -- 82 22 120/58 75 94 % -- --   04/29/23 0800 98 2 °F (36 8 °C) 70 -- 145/86 100 96 % -- --   04/29/23 0700 -- 68 23 Abnormal  112/55 72 94 % -- --   04/29/23 0630 -- 74 26 Abnormal  110/48 Abnormal  66 94 % -- --   04/29/23 0615 -- 74 20 -- -- 93 % -- --   04/29/23 0600 -- 76 20 122/55 78 93 % -- --   04/29/23 0543 98 6 °F (37 °C) -- -- -- -- -- -- --   04/29/23 0530 -- 78 19 118/55 82 93 % -- --   04/29/23 0500 -- 96 24 Abnormal  141/79 101 96 % -- --   04/29/23 0430 -- 78 23 Abnormal  108/64 78 94 % -- --   04/29/23 0400 -- 76 21 123/73 110 93 % -- --   04/29/23 0330 -- 76 22 119/57 83 94 % -- --   04/29/23 0300 -- 76 21 128/59 92 93 % -- --   04/29/23 0230 -- 80 22 122/60 78 92 % -- --   04/29/23 0200 -- 80 22 126/59 80 92 % -- --   04/29/23 0130 -- 82 22 138/63 94 92 % -- --   04/29/23 0100 -- 76 25 Abnormal  128/61 81 94 % -- --   04/29/23 0000 -- 80 22 137/61 84 94 % -- Lying   04/28/23 2300 -- 78 24 Abnormal  84/54 Abnormal  64 Abnormal  96 % -- --   04/28/23 2230 -- 82 21 132/61 88 91 % -- --   04/28/23 2200 -- 84 22 108/65 89 92 % -- --   04/28/23 2130 -- 86 15 95/60 79 96 % -- --   04/28/23 2100 -- 84 25 Abnormal  91/51 64 Abnormal  95 % -- --   04/28/23 2030 -- 82 24 Abnormal  117/59 77 91 % -- --   04/28/23 2000 98 9 °F (37 2 °C) 84 22 105/65 77 -- -- --   04/28/23 1800 -- 88 21 119/55 73 91 % -- --   04/28/23 1600 99 1 °F (37 3 °C) 96 26 Abnormal  133/70 92 90 % -- --   04/28/23 1500 -- 98 22 110/71 81 94 % -- --   04/28/23 1431 -- 100 31 Abnormal  129/63 82 94 % -- --   04/28/23 1400 -- 102 33 Abnormal  144/64 83 92 % -- --   04/28/23 1300 -- 106 Abnormal  40 Abnormal  119/62 78 90 % -- --   04/28/23 1230 101 8 °F (38 8 °C) Abnormal  105 20 140/70 92 91 % None (Room air) Lying         Pertinent Labs/Diagnostic Test Results:   XR clavicle right   Final Result by Aramis Jensen MD (04/29 1523)      Healing clavicular fracture              Workstation performed: [id number]               Results from last 7 days   Lab Units 04/30/23  0607 04/28/23  0550   WBC Thousand/uL 10 32* 8 54   HEMOGLOBIN g/dL 10 6* 12 4   HEMATOCRIT % 32 7* 40 6   PLATELETS Thousands/uL 271 359   NEUTROS ABS Thousands/µL 6 76 4 71         Results from last 7 days   Lab Units 04/30/23  0607 04/29/23  0446 04/28/23  0550   SODIUM mmol/L 138 138 135   POTASSIUM mmol/L 3 5 3 6 3 8   CHLORIDE mmol/L 106 107 99   CO2 mmol/L 26 22 20*   ANION GAP mmol/L 6 9 16*   BUN mg/dL 10 15 18   CREATININE mg/dL 0 78 0 87 1 33*   EGFR ml/min/1 73sq m 113 108 66   CALCIUM mg/dL 8 5 8 4 8 9   MAGNESIUM mg/dL  --  2 3  --      Results from last 7 days   Lab Units 04/28/23  0550   AST U/L 25   ALT U/L 19   ALK PHOS U/L 84   TOTAL PROTEIN g/dL 7 3   ALBUMIN g/dL 4 0   TOTAL BILIRUBIN mg/dL 0 36     Results from last 7 days   Lab Units 04/28/23  0548   POC GLUCOSE mg/dl 226*     Results from last 7 days   Lab Units 04/30/23  0607 04/29/23  0446 04/28/23  0550   GLUCOSE RANDOM mg/dL 117 80 207*           Results from last 7 days   Lab Units 04/28/23  0550   CK TOTAL U/L 235                   Results from last 7 days   Lab Units 04/28/23  0654   CLARITY UA  Slightly Cloudy*   COLOR UA  Yellow   SPEC GRAV UA  1 025   PH UA  5 0   GLUCOSE UA mg/dl Negative   KETONES UA mg/dl Negative   BLOOD UA  25 0*   PROTEIN UA mg/dl 30 (1+)*   NITRITE UA  Negative   BILIRUBIN UA  Negative   UROBILINOGEN UA mg/dL Negative   LEUKOCYTES UA  Negative   WBC UA /hpf 2-4   RBC UA /hpf 2-4   BACTERIA UA /hpf Moderate*   EPITHELIAL CELLS WET PREP /hpf Occasional   MUCUS THREADS  Occasional*             Results from last 7 days   Lab Units 04/28/23  0647   AMPH/METH  Negative   BARBITURATE UR  Negative   BENZODIAZEPINE UR  Negative   COCAINE UR  Positive*   METHADONE URINE  Negative   OPIATE UR  Positive*   PCP UR  Negative   THC UR  Negative     Results from last 7 days   Lab Units 04/28/23  0550   ETHANOL LVL mg/dL <10                 Results from last 7 days   Lab Units 04/28/23  1429   BLOOD CULTURE  No Growth at 48 hrs  No Growth at 48 hrs               Present on Admission:   Respiratory failure with hypoxia (HCC)   Atrial fibrillation (HCC)   (Resolved) Fever of unknown origin   Acute pulmonary edema (HCC)   (Resolved) Sinus tachycardia      Admitting Diagnosis: Substance abuse ([facility] [address] ) [F19 10]  Age/Sex: 42 y o  male  Admission Orders:  Scheduled Medications:  SQ lovenox  Q 12 hrs  Melatonin daily  IV  pepcid   BID      Continuous IV Infusions:  Narcan drip        PRN Meds:    IP CONSULT TO CASE MANAGEMENT  IP CONSULT TO ORTHOPEDIC SURGERY    Network Utilization Review Department  ATTENTION: Please call with any questions or concerns to [phone] and carefully listen to the prompts so that you are directed to the right person  All voicemails are confidential   Kal David all requests for admission clinical reviews, approved or denied determinations and any other requests to dedicated fax number below belonging to the campus where the patient is receiving treatment   List of dedicated fax numbers for the Facilities:  [address] DENIALS (Administrative/Medical Necessity) [phone]   [address] (Maternity/NICU/Pediatrics) [phone]   [address] [place] [address]  [phone]   [address] [facility] [place] [address] [facility] [address] [facility] [facility] [phone]

## 2023-05-01 NOTE — NURSING NOTE
[facility] discharge to [facility]  RN took patient down in wheelchair  Discharge instructions given to pt  All question answered  Education, care plan complete  Patient IV removed  All patient belongings sent with patient   [employer]  73

## 2023-05-03 LAB
BACTERIA BLD CULT: NORMAL
BACTERIA BLD CULT: NORMAL

## 2025-08-06 ENCOUNTER — APPOINTMENT (OUTPATIENT)
Dept: RADIOLOGY | Facility: CLINIC | Age: 40
End: 2025-08-06
Attending: ORTHOPAEDIC SURGERY
Payer: OTHER GOVERNMENT

## 2025-08-06 VITALS — BODY MASS INDEX: 24.91 KG/M2 | HEIGHT: 72 IN

## 2025-08-06 DIAGNOSIS — M79.645 FINGER PAIN, LEFT: Primary | ICD-10-CM

## 2025-08-06 DIAGNOSIS — S63.259A DISLOCATION OF FINGER, INITIAL ENCOUNTER: ICD-10-CM

## 2025-08-06 PROCEDURE — 99203 OFFICE O/P NEW LOW 30 MIN: CPT | Performed by: ORTHOPAEDIC SURGERY

## 2025-08-06 RX ORDER — GABAPENTIN 400 MG/1
400 CAPSULE ORAL 3 TIMES DAILY
COMMUNITY
Start: 2025-06-12